# Patient Record
Sex: MALE | Race: WHITE | Employment: OTHER | URBAN - METROPOLITAN AREA
[De-identification: names, ages, dates, MRNs, and addresses within clinical notes are randomized per-mention and may not be internally consistent; named-entity substitution may affect disease eponyms.]

---

## 2022-02-28 ENCOUNTER — OFFICE VISIT (OUTPATIENT)
Dept: INTERNAL MEDICINE | Age: 87
End: 2022-02-28
Payer: MEDICARE

## 2022-02-28 VITALS
DIASTOLIC BLOOD PRESSURE: 68 MMHG | OXYGEN SATURATION: 98 % | WEIGHT: 182 LBS | SYSTOLIC BLOOD PRESSURE: 138 MMHG | BODY MASS INDEX: 26.11 KG/M2 | HEART RATE: 63 BPM

## 2022-02-28 DIAGNOSIS — H60.502 ACUTE OTITIS EXTERNA OF LEFT EAR, UNSPECIFIED TYPE: ICD-10-CM

## 2022-02-28 DIAGNOSIS — H91.92 DECREASED HEARING OF LEFT EAR: ICD-10-CM

## 2022-02-28 DIAGNOSIS — H61.22 IMPACTED CERUMEN OF LEFT EAR: ICD-10-CM

## 2022-02-28 PROBLEM — Z96.1 PSEUDOPHAKIA: Status: ACTIVE | Noted: 2018-08-27

## 2022-02-28 PROCEDURE — 99212 OFFICE O/P EST SF 10 MIN: CPT

## 2022-02-28 RX ORDER — OFLOXACIN 3 MG/ML
10 SOLUTION AURICULAR (OTIC) DAILY
Qty: 3.5 ML | Refills: 0 | Status: SHIPPED | OUTPATIENT
Start: 2022-02-28 | End: 2022-03-07

## 2022-02-28 SDOH — ECONOMIC STABILITY: FOOD INSECURITY: WITHIN THE PAST 12 MONTHS, YOU WORRIED THAT YOUR FOOD WOULD RUN OUT BEFORE YOU GOT MONEY TO BUY MORE.: NEVER TRUE

## 2022-02-28 SDOH — ECONOMIC STABILITY: FOOD INSECURITY: WITHIN THE PAST 12 MONTHS, THE FOOD YOU BOUGHT JUST DIDN'T LAST AND YOU DIDN'T HAVE MONEY TO GET MORE.: NEVER TRUE

## 2022-02-28 ASSESSMENT — PATIENT HEALTH QUESTIONNAIRE - PHQ9
SUM OF ALL RESPONSES TO PHQ QUESTIONS 1-9: 0
8. MOVING OR SPEAKING SO SLOWLY THAT OTHER PEOPLE COULD HAVE NOTICED. OR THE OPPOSITE, BEING SO FIGETY OR RESTLESS THAT YOU HAVE BEEN MOVING AROUND A LOT MORE THAN USUAL: 0
9. THOUGHTS THAT YOU WOULD BE BETTER OFF DEAD, OR OF HURTING YOURSELF: 0
SUM OF ALL RESPONSES TO PHQ QUESTIONS 1-9: 0
4. FEELING TIRED OR HAVING LITTLE ENERGY: 0
5. POOR APPETITE OR OVEREATING: 0
SUM OF ALL RESPONSES TO PHQ9 QUESTIONS 1 & 2: 0
1. LITTLE INTEREST OR PLEASURE IN DOING THINGS: 0
3. TROUBLE FALLING OR STAYING ASLEEP: 0
7. TROUBLE CONCENTRATING ON THINGS, SUCH AS READING THE NEWSPAPER OR WATCHING TELEVISION: 0
SUM OF ALL RESPONSES TO PHQ QUESTIONS 1-9: 0
6. FEELING BAD ABOUT YOURSELF - OR THAT YOU ARE A FAILURE OR HAVE LET YOURSELF OR YOUR FAMILY DOWN: 0
10. IF YOU CHECKED OFF ANY PROBLEMS, HOW DIFFICULT HAVE THESE PROBLEMS MADE IT FOR YOU TO DO YOUR WORK, TAKE CARE OF THINGS AT HOME, OR GET ALONG WITH OTHER PEOPLE: 0
SUM OF ALL RESPONSES TO PHQ QUESTIONS 1-9: 0
2. FEELING DOWN, DEPRESSED OR HOPELESS: 0

## 2022-02-28 ASSESSMENT — SOCIAL DETERMINANTS OF HEALTH (SDOH): HOW HARD IS IT FOR YOU TO PAY FOR THE VERY BASICS LIKE FOOD, HOUSING, MEDICAL CARE, AND HEATING?: NOT HARD AT ALL

## 2022-02-28 NOTE — PATIENT INSTRUCTIONS
Debrox ear drops available over the counter. Use as needed to aid in cerumen removal.    Thank you for enrolling in [address] Please follow the instructions below to securely access your online medical record. [email or web address] allows you to send messages to your doctor, view your test results, renew your prescriptions, schedule appointments, and more. How Do I Sign Up? 1. In your Internet browser, go to [email or web address]  2. Click on the Sign Up Now link in the Sign In box. You will see the New Member Sign Up page. 3. Enter your [email or web address] Access Code exactly as it appears below. You will not need to use this code after youve completed the sign-up process. If you do not sign up before the expiration date, you must request a new code. [email or web address] Access Code: [email or web address]  Expires: 4/14/2022  3:09 PM    4. Enter your Social Security Number ([id number]) and Date of Birth (mm/dd/yyyy) as indicated and click Submit. You will be taken to the next sign-up page. 5. Create a [email or web address] ID. This will be your [email or web address] login ID and cannot be changed, so think of one that is secure and easy to remember. 6. Create a [email or web address] password. You can change your password at any time. 7. Enter your Password Reset Question and Answer. This can be used at a later time if you forget your password. 8. Enter your e-mail address. You will receive e-mail notification when new information is available in [address] 9. Click Sign Up. You can now view your medical record. Additional Information  If you have questions, please contact the physician practice where you receive care. Remember, [email or web address] is NOT to be used for urgent needs. For medical emergencies, dial 911. For questions regarding your [email or web address] account call [phone]. If you have a clinical question, please call your doctor's office.   Patient Education        Swimmer's Ear: Care Instructions  Your Care Instructions     Swimmer's ear (otitis externa) is inflammation or infection of the ear canal. This is the passage that leads from the outer ear to the eardrum. Any water, sand, or other debris that gets into the ear canal and stays there can cause swimmer's ear. Putting cotton swabs or other items in the ear to clean it can also cause this problem. Swimmer's ear can be very painful. But you can treat the pain and infection with medicines. You should feel better in a few days. Follow-up care is a key part of your treatment and safety. Be sure to make and go to all appointments, and call your doctor if you are having problems. It's also a good idea to know your test results and keep a list of the medicines you take. How can you care for yourself at home? Cleaning and care  · Use antibiotic drops as your doctor directs. · Do not insert ear drops (other than the antibiotic ear drops) or anything else into the ear unless your doctor has told you to. · Avoid getting water in the ear until the problem clears up. Use cotton lightly coated with petroleum jelly as an earplug. Do not use plastic earplugs. · Use a hair dryer set on low to carefully dry the ear after you shower. · To ease ear pain, hold a warm washcloth against your ear. · Take pain medicines exactly as directed. ? If the doctor gave you a prescription medicine for pain, take it as prescribed. ? If you are not taking a prescription pain medicine, ask your doctor if you can take an over-the-counter medicine. Inserting ear drops  · Warm the drops to body temperature by rolling the container in your hands. Or you can place it in a cup of warm water for a few minutes. · Lie down, with your ear facing up. · Place drops inside the ear. Follow your doctor's instructions (or the directions on the label) for how many drops to use. Gently wiggle the outer ear or pull the ear up and back to help the drops get into the ear. · It's important to keep the liquid in the ear canal for 3 to 5 minutes.   When should you call for help? Call your doctor now or seek immediate medical care if:    · You have a new or higher fever.     · You have new or worse pain, swelling, warmth, or redness around or behind your ear.     · You have new or increasing pus or blood draining from your ear. Watch closely for changes in your health, and be sure to contact your doctor if:    · You are not getting better after 2 days (48 hours). Where can you learn more? Go to [email or web address] and sign in to your [employer] account. Enter C706 in the [employer] box to learn more about \"Swimmer's Ear: Care Instructions. \"     If you do not have an account, please click on the \"Sign Up Now\" link. Current as of: September 8, 2021               Content Version: 13.1  © 7785-7461 Healthwise, Incorporated. Care instructions adapted under license by [facility] ([facility]). If you have questions about a medical condition or this instruction, always ask your healthcare professional. [address] any warranty or liability for your use of this information.

## 2022-03-09 ENCOUNTER — OFFICE VISIT (OUTPATIENT)
Dept: INTERNAL MEDICINE | Age: 87
End: 2022-03-09
Payer: MEDICARE

## 2022-03-09 VITALS
DIASTOLIC BLOOD PRESSURE: 80 MMHG | HEIGHT: 70 IN | WEIGHT: 182 LBS | BODY MASS INDEX: 26.05 KG/M2 | OXYGEN SATURATION: 96 % | SYSTOLIC BLOOD PRESSURE: 138 MMHG | HEART RATE: 71 BPM

## 2022-03-09 DIAGNOSIS — S01.21XA LACERATION OF NOSE WITHOUT COMPLICATION, INITIAL ENCOUNTER: Primary | ICD-10-CM

## 2022-03-09 PROCEDURE — 99213 OFFICE O/P EST LOW 20 MIN: CPT | Performed by: NURSE PRACTITIONER

## 2022-03-09 ASSESSMENT — ENCOUNTER SYMPTOMS
WHEEZING: 0
CHEST TIGHTNESS: 0
SHORTNESS OF BREATH: 0
TROUBLE SWALLOWING: 0
FACIAL SWELLING: 0
COUGH: 0
SORE THROAT: 0

## 2022-03-09 NOTE — PROGRESS NOTES
Mariaelena Miller (:  1933) is a 80 y.o. male, Established patient, here for evaluation of the following chief complaint(s):  Fall (Pt fell and hit nose )      Vitals:    22 1714   BP: 138/80   Pulse: 71   SpO2: 96%       ASSESSMENT/PLAN:  1. Laceration of nose without complication, initial encounter        -    Keep clean and dry        -    Do not get steri strip wet for 24-36 hours        -    Return as needed    Return if symptoms worsen or fail to improve. SUBJECTIVE/OBJECTIVE:    Pt states he tripped over the parking cement block where people park their cars over at [employer]. Pt states someone was holding the door open for him and he forgot the cement parking blocks were there and he tripped, fell and hit his nose. No LOC, no other injuries. Pt went home cleaned with soap and water, requests to try to avoid stitches if possible. States nose sore, but its ok. Pt applying pressure with gauze, bleeding is controlled. Review of Systems   Constitutional: Negative for chills, fatigue and fever. HENT: Negative for congestion, facial swelling, sore throat and trouble swallowing. Respiratory: Negative for cough, chest tightness, shortness of breath and wheezing. Cardiovascular: Negative for chest pain and palpitations. Musculoskeletal: Negative for arthralgias, myalgias and neck pain. Skin: Positive for wound. Negative for pallor and rash. Physical Exam  Vitals reviewed. Constitutional:       General: He is not in acute distress. Appearance: Normal appearance. HENT:      Nose: Nose lacerations: approx 0.7cm vertical laceration to the left nares/septum. Comments: Laceration less than 0.3 cm deep. Bleeding controlled. Pt request no stitch if possible. Skin around lac cleaned w/soap and water PTA. Cleaned skin around the lac with alcohol, placed 1 steri strip, edges of wound approximated. Mouth/Throat:      Lips: Pink.       Mouth: Mucous membranes are moist.      Pharynx: Oropharynx is clear. No pharyngeal swelling or posterior oropharyngeal erythema. Cardiovascular:      Rate and Rhythm: Normal rate and regular rhythm. Heart sounds: Normal heart sounds, S1 normal and S2 normal.   Pulmonary:      Effort: Pulmonary effort is normal. No respiratory distress. Breath sounds: Normal air entry. Skin:     General: Skin is warm and dry. Findings: Laceration (left side of nasal septum) present. Neurological:      Mental Status: He is alert and oriented to person, place, and time. Psychiatric:         Mood and Affect: Mood normal.         Behavior: Behavior is cooperative. An electronic signature was used to authenticate this note.     --Arti Olivarez, APRN

## 2022-07-26 NOTE — PROGRESS NOTES
Subjective:      Constantin Waller is a 80 y.o. male who presents for evaluation of a plugged ear. He noticed the symptoms in the left ear, a few days ago. Sunday Boss admits to ear pain. Patient's medications, allergies, past medical, surgical, social and family histories were reviewed and updated as appropriate. Review of Systems  A comprehensive review of systems was negative except for: Ears, nose, mouth, throat, and face: positive for earaches in left ear. Objective:      /68   Pulse 63   Wt 182 lb (82.6 kg)   SpO2 98%   BMI 26.11 kg/m²   General: alert, appears stated age and cooperative   Right Ear: normal appearance   Left Ear:  cerumen impaction   After removal: erythematous on the left         Assessment:      Cerumen Impaction, with otitis externa. Plan:      Cerumen removed by flushing after use of wax softener. Care instructions given. Home treatment: Floxin Otic. Follow up as needed. Xolair Pregnancy And Lactation Text: This medication is Pregnancy Category B and is considered safe during pregnancy. This medication is excreted in breast milk.

## 2022-10-26 ENCOUNTER — NURSE ONLY (OUTPATIENT)
Dept: INTERNAL MEDICINE | Age: 87
End: 2022-10-26
Payer: MEDICARE

## 2022-10-26 DIAGNOSIS — Z23 NEEDS FLU SHOT: Primary | ICD-10-CM

## 2022-10-26 PROCEDURE — G0008 ADMIN INFLUENZA VIRUS VAC: HCPCS | Performed by: NURSE PRACTITIONER

## 2022-10-26 PROCEDURE — 90694 VACC AIIV4 NO PRSRV 0.5ML IM: CPT | Performed by: NURSE PRACTITIONER

## 2022-10-26 NOTE — PROGRESS NOTES
After obtaining consent, and per orders of Vinay Mcintyre, injection of flu vaccine given in Left deltoid by Gloria Porter MA. Patient instructed to remain in clinic for 20 minutes afterwards, and to report any adverse reaction to me immediately. Vaccine Information Sheet, \"Influenza - Inactivated\"  given to Eben Barnes, or parent/legal guardian of  Eben Barnes and verbalized understanding. Patient responses:    Have you ever had a reaction to a flu vaccine? No  Are you able to eat eggs without adverse effects? Yes  Do you have any current illness? No  Have you ever had Guillian [place] Syndrome? No    Flu vaccine given per order. Please see immunization tab.

## 2022-11-04 ENCOUNTER — OFFICE VISIT (OUTPATIENT)
Dept: INTERNAL MEDICINE | Age: 87
End: 2022-11-04
Payer: MEDICARE

## 2022-11-04 VITALS
SYSTOLIC BLOOD PRESSURE: 132 MMHG | OXYGEN SATURATION: 99 % | WEIGHT: 174 LBS | TEMPERATURE: 98.2 F | HEIGHT: 70 IN | DIASTOLIC BLOOD PRESSURE: 70 MMHG | BODY MASS INDEX: 24.91 KG/M2 | HEART RATE: 69 BPM

## 2022-11-04 DIAGNOSIS — J01.40 ACUTE NON-RECURRENT PANSINUSITIS: ICD-10-CM

## 2022-11-04 DIAGNOSIS — H65.03 NON-RECURRENT ACUTE SEROUS OTITIS MEDIA OF BOTH EARS: Primary | ICD-10-CM

## 2022-11-04 PROCEDURE — 1123F ACP DISCUSS/DSCN MKR DOCD: CPT | Performed by: NURSE PRACTITIONER

## 2022-11-04 PROCEDURE — 99213 OFFICE O/P EST LOW 20 MIN: CPT | Performed by: NURSE PRACTITIONER

## 2022-11-04 RX ORDER — FLUTICASONE PROPIONATE 50 MCG
1 SPRAY, SUSPENSION (ML) NASAL DAILY
Qty: 1 EACH | Refills: 0 | Status: SHIPPED | OUTPATIENT
Start: 2022-11-04

## 2022-11-04 RX ORDER — LORATADINE 10 MG/1
10 TABLET ORAL DAILY
Qty: 30 TABLET | Refills: 0 | Status: SHIPPED | OUTPATIENT
Start: 2022-11-04

## 2022-11-04 RX ORDER — AMOXICILLIN 500 MG/1
500 CAPSULE ORAL 2 TIMES DAILY
Qty: 14 CAPSULE | Refills: 0 | Status: SHIPPED | OUTPATIENT
Start: 2022-11-04 | End: 2022-11-11

## 2022-11-04 RX ORDER — ACETAMINOPHEN 500 MG
500 TABLET ORAL EVERY 6 HOURS PRN
Qty: 120 TABLET | Refills: 0 | Status: SHIPPED | OUTPATIENT
Start: 2022-11-04

## 2022-11-04 NOTE — PROGRESS NOTES
Tim Smith (:  1933) is a 80 y.o. male, Established patient, here for evaluation of the following chief complaint(s): Other (Fuentes ear pain needs flushed )      Vitals:    22 1522   BP: 132/70   Pulse: 69   Temp: 98.2 °F (36.8 °C)   SpO2: 99%       ASSESSMENT/PLAN:  1. Non-recurrent acute serous otitis media of both ears  -     fluticasone (FLONASE) 50 MCG/ACT nasal spray; 1 spray by Nasal route daily, Disp-1 each, R-0Normal  -     amoxicillin (AMOXIL) 500 MG capsule; Take 1 capsule by mouth 2 times daily for 7 days, Disp-14 capsule, R-0Normal  -     loratadine (CLARITIN) 10 MG tablet; Take 1 tablet by mouth daily, Disp-30 tablet, R-0Normal  2. Acute non-recurrent pansinusitis  -     fluticasone (FLONASE) 50 MCG/ACT nasal spray; 1 spray by Nasal route daily, Disp-1 each, R-0Normal  -     loratadine (CLARITIN) 10 MG tablet; Take 1 tablet by mouth daily, Disp-30 tablet, R-0Normal  -     acetaminophen (TYLENOL) 500 MG tablet; Take 1 tablet by mouth every 6 hours as needed for Pain, Disp-120 tablet, R-0Normal      Return if symptoms worsen or fail to improve. SUBJECTIVE/OBJECTIVE:    Other  This is a new problem. Episode onset: bilateral ear pain, states would like ears flushed, thinks wax is in both ears. The problem occurs constantly. The problem has been unchanged. Associated symptoms include congestion and coughing. Pertinent negatives include no arthralgias, chest pain, chills, fatigue, fever, myalgias, neck pain or sore throat. Nothing aggravates the symptoms. He has tried nothing for the symptoms. Review of Systems   Constitutional:  Negative for chills, fatigue and fever. HENT:  Positive for congestion, ear pain (bilateral), postnasal drip, rhinorrhea and sinus pressure. Negative for sore throat. Eyes:  Negative for discharge, redness and itching. Respiratory:  Positive for cough. Negative for shortness of breath and wheezing.     Cardiovascular:  Negative for chest pain and palpitations. Musculoskeletal:  Negative for arthralgias, myalgias and neck pain. Physical Exam  Vitals reviewed. Constitutional:       General: He is awake. He is not in acute distress. Appearance: Normal appearance. HENT:      Right Ear: A middle ear effusion is present. Tympanic membrane is not erythematous. Left Ear: A middle ear effusion is present. Tympanic membrane is not erythematous. Nose: Mucosal edema present. Right Turbinates: Swollen. Left Turbinates: Swollen. Right Sinus: Maxillary sinus tenderness and frontal sinus tenderness present. Left Sinus: Maxillary sinus tenderness and frontal sinus tenderness present. Mouth/Throat:      Lips: Pink. Mouth: Mucous membranes are moist.      Pharynx: No pharyngeal swelling, oropharyngeal exudate (post nasal drip) or posterior oropharyngeal erythema. Eyes:      General: Lids are normal.      Extraocular Movements: Extraocular movements intact. Conjunctiva/sclera: Conjunctivae normal.      Pupils: Pupils are equal, round, and reactive to light. Cardiovascular:      Rate and Rhythm: Normal rate and regular rhythm. Heart sounds: Normal heart sounds, S1 normal and S2 normal.   Pulmonary:      Effort: Pulmonary effort is normal. No respiratory distress. Breath sounds: Normal air entry. No decreased breath sounds, wheezing, rhonchi or rales. Musculoskeletal:      Cervical back: Normal range of motion. Skin:     General: Skin is warm and dry. Neurological:      Mental Status: He is alert and oriented to person, place, and time. Psychiatric:         Mood and Affect: Mood normal.         Behavior: Behavior is cooperative. An electronic signature was used to authenticate this note.     --JEANNINE Reagan

## 2022-11-06 ASSESSMENT — ENCOUNTER SYMPTOMS
SHORTNESS OF BREATH: 0
EYE REDNESS: 0
SORE THROAT: 0
EYE ITCHING: 0
WHEEZING: 0
EYE DISCHARGE: 0
RHINORRHEA: 1
COUGH: 1
SINUS PRESSURE: 1

## 2023-10-06 ENCOUNTER — OFFICE VISIT (OUTPATIENT)
Dept: FAMILY MEDICINE CLINIC | Age: 88
End: 2023-10-06
Payer: MEDICARE

## 2023-10-06 VITALS
WEIGHT: 164 LBS | DIASTOLIC BLOOD PRESSURE: 70 MMHG | HEIGHT: 70 IN | BODY MASS INDEX: 23.48 KG/M2 | TEMPERATURE: 98.3 F | OXYGEN SATURATION: 97 % | SYSTOLIC BLOOD PRESSURE: 138 MMHG | HEART RATE: 78 BPM

## 2023-10-06 DIAGNOSIS — Z87.448 HISTORY OF KIDNEY PROBLEMS: Primary | ICD-10-CM

## 2023-10-06 PROCEDURE — 99212 OFFICE O/P EST SF 10 MIN: CPT | Performed by: NURSE PRACTITIONER

## 2023-10-06 PROCEDURE — 1123F ACP DISCUSS/DSCN MKR DOCD: CPT | Performed by: NURSE PRACTITIONER

## 2023-10-06 PROCEDURE — G8427 DOCREV CUR MEDS BY ELIG CLIN: HCPCS | Performed by: NURSE PRACTITIONER

## 2023-10-06 PROCEDURE — G8420 CALC BMI NORM PARAMETERS: HCPCS | Performed by: NURSE PRACTITIONER

## 2023-10-06 PROCEDURE — 1036F TOBACCO NON-USER: CPT | Performed by: NURSE PRACTITIONER

## 2023-10-06 PROCEDURE — G8484 FLU IMMUNIZE NO ADMIN: HCPCS | Performed by: NURSE PRACTITIONER

## 2023-10-06 SDOH — ECONOMIC STABILITY: FOOD INSECURITY: WITHIN THE PAST 12 MONTHS, THE FOOD YOU BOUGHT JUST DIDN'T LAST AND YOU DIDN'T HAVE MONEY TO GET MORE.: NEVER TRUE

## 2023-10-06 SDOH — ECONOMIC STABILITY: FOOD INSECURITY: WITHIN THE PAST 12 MONTHS, YOU WORRIED THAT YOUR FOOD WOULD RUN OUT BEFORE YOU GOT MONEY TO BUY MORE.: NEVER TRUE

## 2023-10-06 SDOH — ECONOMIC STABILITY: INCOME INSECURITY: HOW HARD IS IT FOR YOU TO PAY FOR THE VERY BASICS LIKE FOOD, HOUSING, MEDICAL CARE, AND HEATING?: NOT HARD AT ALL

## 2023-10-06 SDOH — ECONOMIC STABILITY: HOUSING INSECURITY
IN THE LAST 12 MONTHS, WAS THERE A TIME WHEN YOU DID NOT HAVE A STEADY PLACE TO SLEEP OR SLEPT IN A SHELTER (INCLUDING NOW)?: NO

## 2023-10-06 ASSESSMENT — ENCOUNTER SYMPTOMS
NAUSEA: 0
ABDOMINAL PAIN: 0
BACK PAIN: 0
WHEEZING: 0
SHORTNESS OF BREATH: 0
DIARRHEA: 0
VOMITING: 0
COUGH: 0

## 2023-10-06 ASSESSMENT — PATIENT HEALTH QUESTIONNAIRE - PHQ9
1. LITTLE INTEREST OR PLEASURE IN DOING THINGS: 0
7. TROUBLE CONCENTRATING ON THINGS, SUCH AS READING THE NEWSPAPER OR WATCHING TELEVISION: 0
SUM OF ALL RESPONSES TO PHQ9 QUESTIONS 1 & 2: 0
8. MOVING OR SPEAKING SO SLOWLY THAT OTHER PEOPLE COULD HAVE NOTICED. OR THE OPPOSITE, BEING SO FIGETY OR RESTLESS THAT YOU HAVE BEEN MOVING AROUND A LOT MORE THAN USUAL: 0
SUM OF ALL RESPONSES TO PHQ QUESTIONS 1-9: 0
9. THOUGHTS THAT YOU WOULD BE BETTER OFF DEAD, OR OF HURTING YOURSELF: 0
4. FEELING TIRED OR HAVING LITTLE ENERGY: 0
5. POOR APPETITE OR OVEREATING: 0
10. IF YOU CHECKED OFF ANY PROBLEMS, HOW DIFFICULT HAVE THESE PROBLEMS MADE IT FOR YOU TO DO YOUR WORK, TAKE CARE OF THINGS AT HOME, OR GET ALONG WITH OTHER PEOPLE: 0
2. FEELING DOWN, DEPRESSED OR HOPELESS: 0
3. TROUBLE FALLING OR STAYING ASLEEP: 0
6. FEELING BAD ABOUT YOURSELF - OR THAT YOU ARE A FAILURE OR HAVE LET YOURSELF OR YOUR FAMILY DOWN: 0
SUM OF ALL RESPONSES TO PHQ QUESTIONS 1-9: 0

## 2023-11-15 ENCOUNTER — OFFICE VISIT (OUTPATIENT)
Dept: FAMILY MEDICINE CLINIC | Age: 88
End: 2023-11-15
Payer: MEDICARE

## 2023-11-15 VITALS
DIASTOLIC BLOOD PRESSURE: 62 MMHG | OXYGEN SATURATION: 98 % | WEIGHT: 154 LBS | HEIGHT: 70 IN | HEART RATE: 79 BPM | BODY MASS INDEX: 22.05 KG/M2 | SYSTOLIC BLOOD PRESSURE: 138 MMHG | TEMPERATURE: 97.1 F

## 2023-11-15 DIAGNOSIS — Z48.00 ENCOUNTER FOR CHANGE OF DRESSING: Primary | ICD-10-CM

## 2023-11-15 PROCEDURE — 99212 OFFICE O/P EST SF 10 MIN: CPT | Performed by: NURSE PRACTITIONER

## 2023-11-15 PROCEDURE — 1123F ACP DISCUSS/DSCN MKR DOCD: CPT | Performed by: NURSE PRACTITIONER

## 2023-11-15 ASSESSMENT — ENCOUNTER SYMPTOMS
TROUBLE SWALLOWING: 0
COUGH: 0
CHEST TIGHTNESS: 0
FACIAL SWELLING: 0
SORE THROAT: 0
SHORTNESS OF BREATH: 0
WHEEZING: 0

## 2023-12-21 ENCOUNTER — HOSPITAL ENCOUNTER (INPATIENT)
Facility: HOSPITAL | Age: 88
LOS: 4 days | Discharge: NURSING, REHAB OR LONG-TERM CARE | DRG: 602 | End: 2023-12-26
Attending: STUDENT IN AN ORGANIZED HEALTH CARE EDUCATION/TRAINING PROGRAM | Admitting: STUDENT IN AN ORGANIZED HEALTH CARE EDUCATION/TRAINING PROGRAM
Payer: MEDICARE

## 2023-12-21 ENCOUNTER — APPOINTMENT (OUTPATIENT)
Dept: RADIOLOGY | Facility: HOSPITAL | Age: 88
DRG: 602 | End: 2023-12-21
Payer: MEDICARE

## 2023-12-21 ENCOUNTER — APPOINTMENT (OUTPATIENT)
Dept: CARDIOLOGY | Facility: HOSPITAL | Age: 88
DRG: 602 | End: 2023-12-21
Payer: MEDICARE

## 2023-12-21 DIAGNOSIS — D64.9 NORMOCYTIC ANEMIA: ICD-10-CM

## 2023-12-21 DIAGNOSIS — L03.115 CELLULITIS OF BOTH LOWER EXTREMITIES: Primary | ICD-10-CM

## 2023-12-21 DIAGNOSIS — R79.89 ELEVATED TROPONIN: ICD-10-CM

## 2023-12-21 DIAGNOSIS — N18.6 ESRD (END STAGE RENAL DISEASE) (MULTI): ICD-10-CM

## 2023-12-21 DIAGNOSIS — L03.116 CELLULITIS OF BOTH LOWER EXTREMITIES: Primary | ICD-10-CM

## 2023-12-21 DIAGNOSIS — L03.119 CELLULITIS OF LOWER EXTREMITY, UNSPECIFIED LATERALITY: ICD-10-CM

## 2023-12-21 PROBLEM — L03.90 CELLULITIS: Status: ACTIVE | Noted: 2023-12-21

## 2023-12-21 LAB
ALBUMIN SERPL BCP-MCNC: 2.9 G/DL (ref 3.4–5)
ALP SERPL-CCNC: 77 U/L (ref 33–136)
ALT SERPL W P-5'-P-CCNC: 6 U/L (ref 10–52)
ANION GAP SERPL CALC-SCNC: 28 MMOL/L (ref 10–20)
AST SERPL W P-5'-P-CCNC: 8 U/L (ref 9–39)
BASOPHILS # BLD AUTO: 0.01 X10*3/UL (ref 0–0.1)
BASOPHILS NFR BLD AUTO: 0.1 %
BILIRUB SERPL-MCNC: 0.4 MG/DL (ref 0–1.2)
BNP SERPL-MCNC: 989 PG/ML (ref 0–99)
BUN SERPL-MCNC: 201 MG/DL (ref 6–23)
CALCIUM SERPL-MCNC: 8.3 MG/DL (ref 8.6–10.3)
CARDIAC TROPONIN I PNL SERPL HS: 158 NG/L (ref 0–20)
CARDIAC TROPONIN I PNL SERPL HS: 171 NG/L (ref 0–20)
CHLORIDE SERPL-SCNC: 97 MMOL/L (ref 98–107)
CO2 SERPL-SCNC: 18 MMOL/L (ref 21–32)
CREAT SERPL-MCNC: 11.86 MG/DL (ref 0.5–1.3)
CRP SERPL-MCNC: 22.07 MG/DL
EOSINOPHIL # BLD AUTO: 0.01 X10*3/UL (ref 0–0.4)
EOSINOPHIL NFR BLD AUTO: 0.1 %
ERYTHROCYTE [DISTWIDTH] IN BLOOD BY AUTOMATED COUNT: 14.2 % (ref 11.5–14.5)
ERYTHROCYTE [SEDIMENTATION RATE] IN BLOOD BY WESTERGREN METHOD: 58 MM/H (ref 0–20)
GFR SERPL CREATININE-BSD FRML MDRD: 4 ML/MIN/1.73M*2
GLUCOSE SERPL-MCNC: 160 MG/DL (ref 74–99)
HCT VFR BLD AUTO: 27.7 % (ref 41–52)
HGB BLD-MCNC: 8.9 G/DL (ref 13.5–17.5)
IMM GRANULOCYTES # BLD AUTO: 0.09 X10*3/UL (ref 0–0.5)
IMM GRANULOCYTES NFR BLD AUTO: 0.8 % (ref 0–0.9)
LACTATE SERPL-SCNC: 2 MMOL/L (ref 0.4–2)
LYMPHOCYTES # BLD AUTO: 0.25 X10*3/UL (ref 0.8–3)
LYMPHOCYTES NFR BLD AUTO: 2.2 %
MCH RBC QN AUTO: 29.6 PG (ref 26–34)
MCHC RBC AUTO-ENTMCNC: 32.1 G/DL (ref 32–36)
MCV RBC AUTO: 92 FL (ref 80–100)
MONOCYTES # BLD AUTO: 0.55 X10*3/UL (ref 0.05–0.8)
MONOCYTES NFR BLD AUTO: 4.9 %
NEUTROPHILS # BLD AUTO: 10.25 X10*3/UL (ref 1.6–5.5)
NEUTROPHILS NFR BLD AUTO: 91.9 %
NRBC BLD-RTO: 0 /100 WBCS (ref 0–0)
PLATELET # BLD AUTO: 220 X10*3/UL (ref 150–450)
POTASSIUM SERPL-SCNC: 5.3 MMOL/L (ref 3.5–5.3)
PROT SERPL-MCNC: 7.7 G/DL (ref 6.4–8.2)
RBC # BLD AUTO: 3.01 X10*6/UL (ref 4.5–5.9)
SODIUM SERPL-SCNC: 138 MMOL/L (ref 136–145)
WBC # BLD AUTO: 11.2 X10*3/UL (ref 4.4–11.3)

## 2023-12-21 PROCEDURE — 2500000004 HC RX 250 GENERAL PHARMACY W/ HCPCS (ALT 636 FOR OP/ED): Performed by: STUDENT IN AN ORGANIZED HEALTH CARE EDUCATION/TRAINING PROGRAM

## 2023-12-21 PROCEDURE — 71045 X-RAY EXAM CHEST 1 VIEW: CPT

## 2023-12-21 PROCEDURE — 71045 X-RAY EXAM CHEST 1 VIEW: CPT | Performed by: RADIOLOGY

## 2023-12-21 PROCEDURE — 85025 COMPLETE CBC W/AUTO DIFF WBC: CPT

## 2023-12-21 PROCEDURE — 99223 1ST HOSP IP/OBS HIGH 75: CPT | Performed by: STUDENT IN AN ORGANIZED HEALTH CARE EDUCATION/TRAINING PROGRAM

## 2023-12-21 PROCEDURE — 80053 COMPREHEN METABOLIC PANEL: CPT

## 2023-12-21 PROCEDURE — 83605 ASSAY OF LACTIC ACID: CPT

## 2023-12-21 PROCEDURE — 87040 BLOOD CULTURE FOR BACTERIA: CPT | Mod: ELYLAB

## 2023-12-21 PROCEDURE — 36415 COLL VENOUS BLD VENIPUNCTURE: CPT

## 2023-12-21 PROCEDURE — 2500000004 HC RX 250 GENERAL PHARMACY W/ HCPCS (ALT 636 FOR OP/ED)

## 2023-12-21 PROCEDURE — 86140 C-REACTIVE PROTEIN: CPT

## 2023-12-21 PROCEDURE — 85652 RBC SED RATE AUTOMATED: CPT

## 2023-12-21 PROCEDURE — 99285 EMERGENCY DEPT VISIT HI MDM: CPT | Performed by: STUDENT IN AN ORGANIZED HEALTH CARE EDUCATION/TRAINING PROGRAM

## 2023-12-21 PROCEDURE — 96361 HYDRATE IV INFUSION ADD-ON: CPT

## 2023-12-21 PROCEDURE — G0378 HOSPITAL OBSERVATION PER HR: HCPCS

## 2023-12-21 PROCEDURE — 96365 THER/PROPH/DIAG IV INF INIT: CPT

## 2023-12-21 PROCEDURE — 93005 ELECTROCARDIOGRAM TRACING: CPT

## 2023-12-21 PROCEDURE — 83880 ASSAY OF NATRIURETIC PEPTIDE: CPT

## 2023-12-21 PROCEDURE — 84484 ASSAY OF TROPONIN QUANT: CPT

## 2023-12-21 RX ORDER — CHOLECALCIFEROL (VITAMIN D3) 25 MCG
2000 TABLET ORAL DAILY
COMMUNITY

## 2023-12-21 RX ORDER — ACETAMINOPHEN 160 MG/5ML
650 SOLUTION ORAL EVERY 4 HOURS PRN
Status: DISCONTINUED | OUTPATIENT
Start: 2023-12-21 | End: 2023-12-26 | Stop reason: HOSPADM

## 2023-12-21 RX ORDER — CALCITRIOL 0.25 UG/1
1 CAPSULE ORAL DAILY
COMMUNITY
Start: 2023-05-23

## 2023-12-21 RX ORDER — FLUTICASONE PROPIONATE 50 MCG
1 SPRAY, SUSPENSION (ML) NASAL DAILY
COMMUNITY
Start: 2022-11-04

## 2023-12-21 RX ORDER — FINASTERIDE 5 MG/1
5 TABLET, FILM COATED ORAL DAILY
COMMUNITY
Start: 2023-08-30

## 2023-12-21 RX ORDER — ACETAMINOPHEN 325 MG/1
650 TABLET ORAL EVERY 4 HOURS PRN
Status: DISCONTINUED | OUTPATIENT
Start: 2023-12-21 | End: 2023-12-26 | Stop reason: HOSPADM

## 2023-12-21 RX ORDER — DOXAZOSIN 4 MG/1
1 TABLET ORAL DAILY
COMMUNITY
Start: 2013-12-10

## 2023-12-21 RX ORDER — ACETAMINOPHEN 650 MG/1
650 SUPPOSITORY RECTAL EVERY 4 HOURS PRN
Status: DISCONTINUED | OUTPATIENT
Start: 2023-12-21 | End: 2023-12-26 | Stop reason: HOSPADM

## 2023-12-21 RX ORDER — SEVELAMER CARBONATE 800 MG/1
800 TABLET, FILM COATED ORAL
COMMUNITY
Start: 2023-11-08

## 2023-12-21 RX ORDER — SODIUM CHLORIDE 9 MG/ML
10 INJECTION, SOLUTION INTRAVENOUS EVERY 12 HOURS
Status: DISCONTINUED | OUTPATIENT
Start: 2023-12-21 | End: 2023-12-26 | Stop reason: HOSPADM

## 2023-12-21 RX ORDER — AMLODIPINE BESYLATE 5 MG/1
1 TABLET ORAL DAILY
COMMUNITY
Start: 2023-05-23

## 2023-12-21 RX ORDER — SODIUM BICARBONATE 650 MG/1
1 TABLET ORAL 2 TIMES DAILY
COMMUNITY
Start: 2023-11-08

## 2023-12-21 RX ORDER — HEPARIN SODIUM 5000 [USP'U]/ML
5000 INJECTION, SOLUTION INTRAVENOUS; SUBCUTANEOUS EVERY 8 HOURS
Status: DISCONTINUED | OUTPATIENT
Start: 2023-12-22 | End: 2023-12-26 | Stop reason: HOSPADM

## 2023-12-21 RX ORDER — LANOLIN ALCOHOL/MO/W.PET/CERES
1 CREAM (GRAM) TOPICAL DAILY
COMMUNITY
Start: 2023-10-18

## 2023-12-21 RX ORDER — POLYETHYLENE GLYCOL 3350 17 G/17G
17 POWDER, FOR SOLUTION ORAL DAILY
COMMUNITY

## 2023-12-21 RX ORDER — CARVEDILOL 25 MG/1
25 TABLET ORAL 2 TIMES DAILY
COMMUNITY
Start: 2023-04-11

## 2023-12-21 RX ORDER — FERROUS SULFATE 325(65) MG
325 TABLET ORAL
COMMUNITY
Start: 2023-05-30

## 2023-12-21 RX ORDER — POLYETHYLENE GLYCOL 3350 17 G/17G
17 POWDER, FOR SOLUTION ORAL DAILY PRN
Status: DISCONTINUED | OUTPATIENT
Start: 2023-12-21 | End: 2023-12-26 | Stop reason: HOSPADM

## 2023-12-21 RX ORDER — VANCOMYCIN HYDROCHLORIDE 1 G/20ML
INJECTION, POWDER, LYOPHILIZED, FOR SOLUTION INTRAVENOUS DAILY PRN
Status: DISCONTINUED | OUTPATIENT
Start: 2023-12-21 | End: 2023-12-26 | Stop reason: HOSPADM

## 2023-12-21 RX ORDER — TORSEMIDE 20 MG/1
2 TABLET ORAL DAILY
COMMUNITY
Start: 2023-04-11

## 2023-12-21 RX ADMIN — PIPERACILLIN SODIUM AND TAZOBACTAM SODIUM 3.38 G: 3; .375 INJECTION, SOLUTION INTRAVENOUS at 21:27

## 2023-12-21 RX ADMIN — SODIUM CHLORIDE, POTASSIUM CHLORIDE, SODIUM LACTATE AND CALCIUM CHLORIDE 500 ML: 600; 310; 30; 20 INJECTION, SOLUTION INTRAVENOUS at 15:53

## 2023-12-21 RX ADMIN — SODIUM CHLORIDE 10 ML/HR: 9 INJECTION, SOLUTION INTRAVENOUS at 21:27

## 2023-12-21 RX ADMIN — VANCOMYCIN HYDROCHLORIDE 2 G: 10 INJECTION, POWDER, LYOPHILIZED, FOR SOLUTION INTRAVENOUS at 16:35

## 2023-12-21 RX ADMIN — PIPERACILLIN AND TAZOBACTAM 4.5 G: 4; .5 INJECTION, POWDER, FOR SOLUTION INTRAVENOUS at 15:52

## 2023-12-21 SDOH — SOCIAL STABILITY: SOCIAL INSECURITY: ARE YOU OR HAVE YOU BEEN THREATENED OR ABUSED PHYSICALLY, EMOTIONALLY, OR SEXUALLY BY ANYONE?: NO

## 2023-12-21 SDOH — SOCIAL STABILITY: SOCIAL INSECURITY: DO YOU FEEL UNSAFE GOING BACK TO THE PLACE WHERE YOU ARE LIVING?: NO

## 2023-12-21 SDOH — SOCIAL STABILITY: SOCIAL INSECURITY: ABUSE: ADULT

## 2023-12-21 SDOH — SOCIAL STABILITY: SOCIAL INSECURITY: ARE THERE ANY APPARENT SIGNS OF INJURIES/BEHAVIORS THAT COULD BE RELATED TO ABUSE/NEGLECT?: NO

## 2023-12-21 SDOH — SOCIAL STABILITY: SOCIAL INSECURITY: DOES ANYONE TRY TO KEEP YOU FROM HAVING/CONTACTING OTHER FRIENDS OR DOING THINGS OUTSIDE YOUR HOME?: NO

## 2023-12-21 SDOH — SOCIAL STABILITY: SOCIAL INSECURITY: HAS ANYONE EVER THREATENED TO HURT YOUR FAMILY OR YOUR PETS?: NO

## 2023-12-21 SDOH — SOCIAL STABILITY: SOCIAL INSECURITY: HAVE YOU HAD THOUGHTS OF HARMING ANYONE ELSE?: NO

## 2023-12-21 SDOH — SOCIAL STABILITY: SOCIAL INSECURITY: DO YOU FEEL ANYONE HAS EXPLOITED OR TAKEN ADVANTAGE OF YOU FINANCIALLY OR OF YOUR PERSONAL PROPERTY?: NO

## 2023-12-21 ASSESSMENT — ACTIVITIES OF DAILY LIVING (ADL)
HEARING - RIGHT EAR: FUNCTIONAL
LACK_OF_TRANSPORTATION: NO
JUDGMENT_ADEQUATE_SAFELY_COMPLETE_DAILY_ACTIVITIES: YES
DRESSING YOURSELF: NEEDS ASSISTANCE
TOILETING: NEEDS ASSISTANCE
HEARING - LEFT EAR: FUNCTIONAL
PATIENT'S MEMORY ADEQUATE TO SAFELY COMPLETE DAILY ACTIVITIES?: YES
FEEDING YOURSELF: INDEPENDENT
WALKS IN HOME: NEEDS ASSISTANCE
BATHING: NEEDS ASSISTANCE
GROOMING: NEEDS ASSISTANCE
ADEQUATE_TO_COMPLETE_ADL: YES

## 2023-12-21 ASSESSMENT — LIFESTYLE VARIABLES
SKIP TO QUESTIONS 9-10: 1
HAVE PEOPLE ANNOYED YOU BY CRITICIZING YOUR DRINKING: NO
EVER FELT BAD OR GUILTY ABOUT YOUR DRINKING: NO
SUBSTANCE_ABUSE_PAST_12_MONTHS: NO
AUDIT-C TOTAL SCORE: 0
HOW OFTEN DO YOU HAVE A DRINK CONTAINING ALCOHOL: NEVER
HOW OFTEN DO YOU HAVE 6 OR MORE DRINKS ON ONE OCCASION: NEVER
PRESCIPTION_ABUSE_PAST_12_MONTHS: NO
AUDIT-C TOTAL SCORE: 0
EVER HAD A DRINK FIRST THING IN THE MORNING TO STEADY YOUR NERVES TO GET RID OF A HANGOVER: NO
HOW MANY STANDARD DRINKS CONTAINING ALCOHOL DO YOU HAVE ON A TYPICAL DAY: PATIENT DOES NOT DRINK
REASON UNABLE TO ASSESS: NO
HAVE YOU EVER FELT YOU SHOULD CUT DOWN ON YOUR DRINKING: NO

## 2023-12-21 ASSESSMENT — PAIN - FUNCTIONAL ASSESSMENT
PAIN_FUNCTIONAL_ASSESSMENT: 0-10
PAIN_FUNCTIONAL_ASSESSMENT: 0-10

## 2023-12-21 ASSESSMENT — COGNITIVE AND FUNCTIONAL STATUS - GENERAL
TOILETING: A LITTLE
STANDING UP FROM CHAIR USING ARMS: A LOT
MOBILITY SCORE: 12
PATIENT BASELINE BEDBOUND: UNABLE TO ASSESS AT THIS TIME
DAILY ACTIVITIY SCORE: 17
DRESSING REGULAR LOWER BODY CLOTHING: A LOT
TURNING FROM BACK TO SIDE WHILE IN FLAT BAD: A LOT
HELP NEEDED FOR BATHING: A LITTLE
DRESSING REGULAR UPPER BODY CLOTHING: A LITTLE
PERSONAL GROOMING: A LITTLE
EATING MEALS: A LITTLE
WALKING IN HOSPITAL ROOM: A LOT
MOVING FROM LYING ON BACK TO SITTING ON SIDE OF FLAT BED WITH BEDRAILS: A LITTLE
CLIMB 3 TO 5 STEPS WITH RAILING: TOTAL
MOVING TO AND FROM BED TO CHAIR: A LOT

## 2023-12-21 ASSESSMENT — COLUMBIA-SUICIDE SEVERITY RATING SCALE - C-SSRS
1. IN THE PAST MONTH, HAVE YOU WISHED YOU WERE DEAD OR WISHED YOU COULD GO TO SLEEP AND NOT WAKE UP?: NO
2. HAVE YOU ACTUALLY HAD ANY THOUGHTS OF KILLING YOURSELF?: NO
6. HAVE YOU EVER DONE ANYTHING, STARTED TO DO ANYTHING, OR PREPARED TO DO ANYTHING TO END YOUR LIFE?: NO

## 2023-12-21 ASSESSMENT — PATIENT HEALTH QUESTIONNAIRE - PHQ9
1. LITTLE INTEREST OR PLEASURE IN DOING THINGS: NOT AT ALL
SUM OF ALL RESPONSES TO PHQ9 QUESTIONS 1 & 2: 0
2. FEELING DOWN, DEPRESSED OR HOPELESS: NOT AT ALL

## 2023-12-21 ASSESSMENT — PAIN SCALES - GENERAL: PAINLEVEL_OUTOF10: 3

## 2023-12-21 NOTE — ED PROVIDER NOTES
"HPI   Chief Complaint   Patient presents with    Wound Check     '[facility] and his left leg is also messed up too.\"       History provided by: Patient and patient's daughter    Limitations to history: None    CC: Wound check    HPI: 89-year-old male presents emergency department with his daughter to be evaluated for bilateral wound checks.  Patient states that he has had problems with his right leg for several months but it has been getting worse over the last few days.  Reports extremity warmth, swelling, redness.  Denies skin weeping with yellow discharge.  [facility] and [facility] that he come to the [facility] to be admitted.  He has not been seeing anyone from wound care been on any antibiotics.  He does have a history of CKD, they are planning on putting him on dialysis but he refused.  He denies history of CHF or cirrhosis.  Denies use of diuretics.  He denies any other history or lung history including ACS, arrhythmia, blood clots.  Denies recent plane flights or surgeries or history of cancer.  States now that he is starting to get the same symptoms in his left lower extremity.  Denies numbness and tingling.  Able to bear weight.  Is not sure how this started, denies any injury.  Denies history of frequent infections or diabetes.  Does report chills but denies fevers.  Denies weakness and fatigue.  Denies chest pain, shortness of breath, cough, mopped assist, all GI and  symptoms.    ROS: Negative unless mentioned in HPI    Social Hx: Denies tobacco, alcohol, drug use    Medical Hx: Medical history significant for chronic kidney disease.  Denies allergies.  Immunizations are up-to-date.    Surgical HX: Denies    Physical exam:    Constitutional: Patient is well-nourished and well-developed.  Sitting comfortably in the room and in no distress.  Oriented to person, place, time, and situation.    HEENT: Head is normocephalic, atraumatic. " Patient's airway is patent.  Tympanic membranes are clear bilaterally.  Nasal mucosa clear.  Mouth with normal mucosa.  Throat is not erythematous and there are no oropharyngeal exudates, uvula is midline.  No obvious facial deformities.    Eyes: Clear bilaterally.  Pupils are equal round and reactive to light and accommodation.  Extraocular movements intact.      Cardiac: Regular rate, regular rhythm.  Heart sounds S1, S2.  No murmurs, rubs, or gallops.  PMI nondisplaced.  No JVD.    Respiratory: Regular respiratory rate and effort.  Breath sounds are clear and equal bilaterally, no adventitious lung sounds.  Patient is speaking in full sentences and is in no apparent respiratory distress. No use of accessory muscles.      Gastrointestinal: Abdomen is soft, nondistended, and nontender.  There are no obvious deformities.  No rebound tenderness or guarding.  Bowel sounds are normal active.    Genitourinary: No CVA or flank tenderness.    Musculoskeletal: Patient has 2+ pitting edema in the bilateral lower extremities. He has skin redness, warmthN, and skin weeping with clear and yellow discharge of the right tibia and fibula and foot that is circumferential.  Also has these findings of the left tibia and fibula without involvement of the foot.  No calf tenderness.  Negative Homans' sign.  No obvious bony deformities.  Patient has equal range of motion in all extremities and no strength deficiencies.  No muscle or joint tenderness. No back or neck tenderness.  Capillary refill less than 3 seconds.  Strong peripheral pulses.  No sensory deficits.    Neurological: Patient is alert and oriented.  No focal deficits.  5/5 strength in all extremities.  Cranial nerves II through XII intact. GCS15.     Skin: See MSK exam for details.    Psych: Appropriate mood and affect.  No apparent risk to self or others.    Heme/lymph: No adenopathy, lymphadenopathy, or splenomegaly    Physical exam is otherwise negative unless stated  above or in history of present illness.                          Ward Coma Scale Score: 15                  Patient History   History reviewed. No pertinent past medical history.  History reviewed. No pertinent surgical history.  No family history on file.  Social History     Tobacco Use    Smoking status: Never    Smokeless tobacco: Never   Vaping Use    Vaping Use: Never used   Substance Use Topics    Alcohol use: Never    Drug use: Never       Physical Exam   ED Triage Vitals [12/21/23 1418]   Temp Heart Rate Resp BP   36.1 °C (97 °F) 79 18 131/60      SpO2 Temp Source Heart Rate Source Patient Position   99 % Tympanic Monitor Sitting      BP Location FiO2 (%)     Right arm --       Physical Exam    ED Course & MDM   Diagnoses as of 12/21/23 1709   Cellulitis of both lower extremities   ESRD (end stage renal disease) (CMS/[facility])   Elevated troponin   Normocytic anemia     Patient updated on plan for lab testing, IV insertion, radiology imaging, and medications to be administered while in the ER (if indicated). Patient updated on expected wait times for testing and results. Patient provided my name and told to ask any staff member for questions or concerns if they should arise. Electronic medical record reviewed.     [facility]    Upon initial assessment, patient was healthy non-toxic appearing and in no apparent distress.     Patient presented to the emergency department with the chief complaint lower extremity wound check. Patient has 2+ pitting edema in the bilateral lower extremities. He has skin redness, warmthN, and skin weeping with clear and yellow discharge of the right tibia and fibula and foot that is circumferential.  Also has these findings of the left tibia and fibula without involvement of the foot.  No calf tenderness.  Negative Homans' sign.  No obvious bony deformities.  Patient has equal range of motion in all extremities and no strength deficiencies.  No muscle or joint tenderness. No back or neck  tenderness.  Capillary refill less than 3 seconds.  Strong peripheral pulses.  No sensory deficits.  Examination otherwise unremarkable.  On arrival to the emergency department, vital signs were within normal limits    Will get blood cultures and will start the patient on IV vancomycin and Zosyn.  Workup initial including basic blood work, EKG, troponin, BNP, chest x-ray given the patient's peripheral edema, inflammatory markers and lactate.  I staffed this patient with my attending, agreeable to plan of care.  Patient's EKG was performed at 1552 and interpreted by me.  There is a normal sinus rhythm 79 beats minute.  IA interval is slightly elongated and there are findings that would suggest a first-degree AV block.  Patient also has an incomplete left bundle branch block.  Patient has diffuse T wave inversion in leads I, aVL, V4 through V6.  There is no ST elevation or depression.  No prolonged QT.    Patient's lactate is within normal limits.  ESR is 58 and CRP is 22.  Original troponin is 170, repeat is 158.  Likely from end-stage renal disease.  BNP is 99 however chest x-ray shows, cardiomegaly but no pulmonary edema or pleural effusion.  CBC shows no leukocytosis however he does have a left shift neutrophil count.  Patient has a normocytic anemia, unknown if this is baseline for him however I do believe is likely from his CKD.  Denies any blood in the urine or stool.  CMP confirms his history of end-stage renal disease without findings that would suggest the patient requires emergent dialysis at this time.  Patient is aware of the repercussions of end-stage renal disease without dialysis.  At this time, I do believe the patient benefit from hospital admission for IV antibiotics for his worsening bilateral lower extremity cellulitis.  I spoke to the hospitalist who is agreeable this plan.  Both the patient and patient's daughter are agreeable.  He remained hemodynamically stable in the ER.        This note was  dictated using a speech recognition program.  While an attempt was made at proof-reading to minimize errors, minor errors in transcription may be present    Medical Decision Making      Procedure  Procedures     Donnell Barksdale PA-C  12/21/23 0066

## 2023-12-21 NOTE — H&P
Medical Group History and Physical  ASSESSMENT & PLAN:     Bilateral lower extremity cellulitis  - Presented from home with worsening lower extremity erythema, swelling, drainage, pain over the past few months  - ESR and CRP significantly elevated  - Will continue vancomycin and Zosyn at this time  - ID consulted    CKD stage V  - GFR 4, creatinine 11.8,   - Patient has adamantly stated that he does not want hemodialysis to multiple providers, no need for nephrology consultation at this time    Essential hypertension  BPH  Hyperlipidemia  Diastolic CHF  - Resume home medications once reconciled    Anemia of chronic disease  - Hemoglobin 8.9 (baseline not available as [facility])    Elevated troponin  - Troponin 171, 158 likely in setting of CKD stage V  - No chest pain, low concerns for ACS with no EKG changes as well    VTE prophylaxis: Heparin subcutaneous      ---Of note, this documentation is completed using the Dragon Dictation system (voice recognition software). There may be spelling and/or grammatical errors that were not corrected prior to final submission.---    Matthew Edge MD    HISTORY OF PRESENT ILLNESS:   Chief Complaint: Bilateral lower extremity wounds    History Of Present Illness:    Joshua Gotti is a 89 y.o. male with a significant past medical history of CKD stage V not seeking hemodialysis, BPH, hypertension, diastolic CHF, hyperlipidemia that presented from home with worsening bilateral lower extremity wounds and swelling.  Patient is a poor historian however states that these wounds have been present for quite a long time however was unable to extrapolate on how long.  He states that his legs have been burning, have been warm, swollen and red with some drainage.  Patient states that he has not seen his PCP for this as well.    Patient has CKD stage V, he states that he does not want any hemodialysis.    ED course:  - Vitals: Temperature 97, HR 79, /60, RR 18  "satting 99% on room air.  - Labs: Hemoglobin of 8.9 otherwise unremarkable CBC.  CMP with renal function consistent with his CKD stage V status, creatinine of 11.8, , GFR 4.  CRP 22, ESR 58.  Troponin 171 decreased to 158.  - Interventions: Received LR bolus 500 mL, Zosyn and vancomycin.     Review of systems: 10 point review of systems is otherwise negative except as mentioned above.    PAST HISTORIES:     Past Medical History: CKD stage V, hypertension, BPH, diastolic CHF, hyperlipidemia    Past Surgical History: Patient unable to clarify      Social History: He states he never smoked tobacco in the past.  Denies current alcohol and illicit drug use.  Lives at home with his daughter.    Family History: Patient unable to clarify     Allergies: Patient has no known allergies.    OBJECTIVE:     Last Recorded Vitals:  Vitals:    12/21/23 1418 12/21/23 1602   BP: 131/60 129/63   BP Location: Right arm    Patient Position: Sitting    Pulse: 79 78   Resp: 18 16   Temp: 36.1 °C (97 °F)    TempSrc: Tympanic    SpO2: 99% 100%   Weight: 72.5 kg (159 lb 13.3 oz)    Height: 1.778 m (5' 10\")      Last I/O:  No intake/output data recorded.    Physical Exam  Vitals reviewed.   Constitutional:       Appearance: Normal appearance. He is normal weight.      Comments: Unkempt in appearance   HENT:      Head: Normocephalic and atraumatic.      Nose: Nose normal.      Mouth/Throat:      Mouth: Mucous membranes are moist.      Pharynx: Oropharynx is clear.      Comments: Poor dentition, missing most of teeth.  Eyes:      Extraocular Movements: Extraocular movements intact.      Conjunctiva/sclera: Conjunctivae normal.      Pupils: Pupils are equal, round, and reactive to light.   Cardiovascular:      Rate and Rhythm: Normal rate and regular rhythm.      Pulses: Normal pulses.      Heart sounds: Normal heart sounds.   Pulmonary:      Effort: Pulmonary effort is normal.      Breath sounds: Normal breath sounds.   Abdominal:      " General: Abdomen is flat. Bowel sounds are normal.      Palpations: Abdomen is soft.      Tenderness: There is no abdominal tenderness.   Musculoskeletal:         General: Normal range of motion.      Cervical back: Normal range of motion and neck supple.      Right lower leg: Edema present.      Left lower leg: Edema present.      Comments: Bilateral lower extremities with 3+ pitting edema.   Skin:     Findings: Erythema and lesion present.      Comments: See below for images of bilateral lower extremities.  Erythematous bilaterally, weeping lesions as well.     Neurological:      General: No focal deficit present.      Mental Status: He is alert and oriented to person, place, and time. Mental status is at baseline.         Scheduled Medications  vancomycin, 2 g, intravenous, Once      PRN Medications    Continuous Medications     Outpatient Medications:  Prior to Admission medications    Not on File     LABS AND IMAGING:     Labs:  Results from last 7 days   Lab Units 12/21/23  1535   WBC AUTO x10*3/uL 11.2   RBC AUTO x10*6/uL 3.01*   HEMOGLOBIN g/dL 8.9*   HEMATOCRIT % 27.7*   MCV fL 92   MCH pg 29.6   MCHC g/dL 32.1   RDW % 14.2   PLATELETS AUTO x10*3/uL 220     Results from last 7 days   Lab Units 12/21/23  1535   SODIUM mmol/L 138   POTASSIUM mmol/L 5.3   CHLORIDE mmol/L 97*   CO2 mmol/L 18*   BUN mg/dL 201*   CREATININE mg/dL 11.86*   GLUCOSE mg/dL 160*   PROTEIN TOTAL g/dL 7.7   CALCIUM mg/dL 8.3*   BILIRUBIN TOTAL mg/dL 0.4   ALK PHOS U/L 77   AST U/L 8*   ALT U/L 6*         Results from last 7 days   Lab Units 12/21/23  1618 12/21/23  1535   TROPHS ng/L 158* 171*       Imaging:  XR chest 1 view  Narrative: Interpreted By:  Noris Erickson,   STUDY:  XR CHEST 1 VIEW;  12/21/2023 4:10 pm      INDICATION:  Signs/Symptoms:peripheral edema.      COMPARISON:  None.      ACCESSION NUMBER(S):  [id number]      ORDERING CLINICIAN:  ERIC GREGG      FINDINGS:  Soft tissue fold overlies and obscures the central upper  chest.  Patient is slightly rotated. Reverse lordotic projection obtained. No  definite focal infiltrate, pleural effusion or pneumothorax as  visualized. The cardiac silhouette is mildly enlarged. Aortic  tortuosity.      Impression: Mildly enlarged cardiac silhouette. No definite focal infiltrate.      MACRO:  None.      Signed by: Noris Erickson 12/21/2023 4:32 PM  Dictation workstation:   [id number]

## 2023-12-22 PROBLEM — L03.116 CELLULITIS OF BOTH LOWER EXTREMITIES: Status: ACTIVE | Noted: 2023-12-22

## 2023-12-22 PROBLEM — L03.115 CELLULITIS OF BOTH LOWER EXTREMITIES: Status: ACTIVE | Noted: 2023-12-22

## 2023-12-22 LAB
ANION GAP SERPL CALC-SCNC: 23 MMOL/L (ref 10–20)
BASOPHILS # BLD AUTO: 0.01 X10*3/UL (ref 0–0.1)
BASOPHILS NFR BLD AUTO: 0.1 %
BUN SERPL-MCNC: 195 MG/DL (ref 6–23)
CALCIUM SERPL-MCNC: 7.8 MG/DL (ref 8.6–10.3)
CHLORIDE SERPL-SCNC: 101 MMOL/L (ref 98–107)
CO2 SERPL-SCNC: 18 MMOL/L (ref 21–32)
CREAT SERPL-MCNC: 12.09 MG/DL (ref 0.5–1.3)
EOSINOPHIL # BLD AUTO: 0.03 X10*3/UL (ref 0–0.4)
EOSINOPHIL NFR BLD AUTO: 0.4 %
ERYTHROCYTE [DISTWIDTH] IN BLOOD BY AUTOMATED COUNT: 14.3 % (ref 11.5–14.5)
GFR SERPL CREATININE-BSD FRML MDRD: 4 ML/MIN/1.73M*2
GLUCOSE SERPL-MCNC: 92 MG/DL (ref 74–99)
HCT VFR BLD AUTO: 22.4 % (ref 41–52)
HGB BLD-MCNC: 7.3 G/DL (ref 13.5–17.5)
HOLD SPECIMEN: NORMAL
IMM GRANULOCYTES # BLD AUTO: 0.07 X10*3/UL (ref 0–0.5)
IMM GRANULOCYTES NFR BLD AUTO: 1 % (ref 0–0.9)
LYMPHOCYTES # BLD AUTO: 0.38 X10*3/UL (ref 0.8–3)
LYMPHOCYTES NFR BLD AUTO: 5.2 %
MCH RBC QN AUTO: 29.3 PG (ref 26–34)
MCHC RBC AUTO-ENTMCNC: 32.6 G/DL (ref 32–36)
MCV RBC AUTO: 90 FL (ref 80–100)
MONOCYTES # BLD AUTO: 0.51 X10*3/UL (ref 0.05–0.8)
MONOCYTES NFR BLD AUTO: 7 %
NEUTROPHILS # BLD AUTO: 6.25 X10*3/UL (ref 1.6–5.5)
NEUTROPHILS NFR BLD AUTO: 86.3 %
NRBC BLD-RTO: 0 /100 WBCS (ref 0–0)
PLATELET # BLD AUTO: 178 X10*3/UL (ref 150–450)
POTASSIUM SERPL-SCNC: 5.1 MMOL/L (ref 3.5–5.3)
RBC # BLD AUTO: 2.49 X10*6/UL (ref 4.5–5.9)
SODIUM SERPL-SCNC: 137 MMOL/L (ref 136–145)
VANCOMYCIN SERPL-MCNC: 14.2 UG/ML (ref 5–20)
VANCOMYCIN SERPL-MCNC: 16.6 UG/ML (ref 5–20)
WBC # BLD AUTO: 7.3 X10*3/UL (ref 4.4–11.3)

## 2023-12-22 PROCEDURE — 99232 SBSQ HOSP IP/OBS MODERATE 35: CPT | Performed by: STUDENT IN AN ORGANIZED HEALTH CARE EDUCATION/TRAINING PROGRAM

## 2023-12-22 PROCEDURE — 87070 CULTURE OTHR SPECIMN AEROBIC: CPT | Mod: ELYLAB | Performed by: STUDENT IN AN ORGANIZED HEALTH CARE EDUCATION/TRAINING PROGRAM

## 2023-12-22 PROCEDURE — 2500000004 HC RX 250 GENERAL PHARMACY W/ HCPCS (ALT 636 FOR OP/ED): Performed by: PHARMACIST

## 2023-12-22 PROCEDURE — 2500000004 HC RX 250 GENERAL PHARMACY W/ HCPCS (ALT 636 FOR OP/ED): Performed by: STUDENT IN AN ORGANIZED HEALTH CARE EDUCATION/TRAINING PROGRAM

## 2023-12-22 PROCEDURE — 97165 OT EVAL LOW COMPLEX 30 MIN: CPT | Mod: GO

## 2023-12-22 PROCEDURE — 1200000002 HC GENERAL ROOM WITH TELEMETRY DAILY

## 2023-12-22 PROCEDURE — 80202 ASSAY OF VANCOMYCIN: CPT | Performed by: STUDENT IN AN ORGANIZED HEALTH CARE EDUCATION/TRAINING PROGRAM

## 2023-12-22 PROCEDURE — 36415 COLL VENOUS BLD VENIPUNCTURE: CPT | Performed by: STUDENT IN AN ORGANIZED HEALTH CARE EDUCATION/TRAINING PROGRAM

## 2023-12-22 PROCEDURE — 2500000001 HC RX 250 WO HCPCS SELF ADMINISTERED DRUGS (ALT 637 FOR MEDICARE OP): Performed by: STUDENT IN AN ORGANIZED HEALTH CARE EDUCATION/TRAINING PROGRAM

## 2023-12-22 PROCEDURE — 80048 BASIC METABOLIC PNL TOTAL CA: CPT | Performed by: STUDENT IN AN ORGANIZED HEALTH CARE EDUCATION/TRAINING PROGRAM

## 2023-12-22 PROCEDURE — 97162 PT EVAL MOD COMPLEX 30 MIN: CPT | Mod: GP

## 2023-12-22 PROCEDURE — 2500000002 HC RX 250 W HCPCS SELF ADMINISTERED DRUGS (ALT 637 FOR MEDICARE OP, ALT 636 FOR OP/ED): Performed by: STUDENT IN AN ORGANIZED HEALTH CARE EDUCATION/TRAINING PROGRAM

## 2023-12-22 PROCEDURE — 85025 COMPLETE CBC W/AUTO DIFF WBC: CPT | Performed by: STUDENT IN AN ORGANIZED HEALTH CARE EDUCATION/TRAINING PROGRAM

## 2023-12-22 PROCEDURE — 96372 THER/PROPH/DIAG INJ SC/IM: CPT | Performed by: STUDENT IN AN ORGANIZED HEALTH CARE EDUCATION/TRAINING PROGRAM

## 2023-12-22 RX ORDER — SEVELAMER CARBONATE 800 MG/1
800 TABLET, FILM COATED ORAL
Status: DISCONTINUED | OUTPATIENT
Start: 2023-12-22 | End: 2023-12-26 | Stop reason: HOSPADM

## 2023-12-22 RX ORDER — FERROUS SULFATE 325(65) MG
1 TABLET ORAL
Status: DISCONTINUED | OUTPATIENT
Start: 2023-12-22 | End: 2023-12-26 | Stop reason: HOSPADM

## 2023-12-22 RX ORDER — TORSEMIDE 20 MG/1
40 TABLET ORAL DAILY
Status: DISCONTINUED | OUTPATIENT
Start: 2023-12-22 | End: 2023-12-26 | Stop reason: HOSPADM

## 2023-12-22 RX ORDER — VANCOMYCIN HYDROCHLORIDE 1 G/200ML
1000 INJECTION, SOLUTION INTRAVENOUS ONCE
Status: COMPLETED | OUTPATIENT
Start: 2023-12-22 | End: 2023-12-22

## 2023-12-22 RX ORDER — DOXAZOSIN 4 MG/1
4 TABLET ORAL DAILY
Status: DISCONTINUED | OUTPATIENT
Start: 2023-12-22 | End: 2023-12-26 | Stop reason: HOSPADM

## 2023-12-22 RX ORDER — SODIUM BICARBONATE 650 MG/1
650 TABLET ORAL 2 TIMES DAILY
Status: DISCONTINUED | OUTPATIENT
Start: 2023-12-22 | End: 2023-12-26 | Stop reason: HOSPADM

## 2023-12-22 RX ORDER — CARVEDILOL 12.5 MG/1
25 TABLET ORAL 2 TIMES DAILY
Status: DISCONTINUED | OUTPATIENT
Start: 2023-12-22 | End: 2023-12-26 | Stop reason: HOSPADM

## 2023-12-22 RX ORDER — FINASTERIDE 5 MG/1
5 TABLET, FILM COATED ORAL DAILY
Status: DISCONTINUED | OUTPATIENT
Start: 2023-12-22 | End: 2023-12-26 | Stop reason: HOSPADM

## 2023-12-22 RX ORDER — FLUTICASONE PROPIONATE 50 MCG
1 SPRAY, SUSPENSION (ML) NASAL DAILY
Status: DISCONTINUED | OUTPATIENT
Start: 2023-12-22 | End: 2023-12-26 | Stop reason: HOSPADM

## 2023-12-22 RX ORDER — CALCITRIOL 0.25 UG/1
0.25 CAPSULE ORAL DAILY
Status: DISCONTINUED | OUTPATIENT
Start: 2023-12-22 | End: 2023-12-26 | Stop reason: HOSPADM

## 2023-12-22 RX ADMIN — PIPERACILLIN SODIUM AND TAZOBACTAM SODIUM 3.38 G: 3; .375 INJECTION, SOLUTION INTRAVENOUS at 22:33

## 2023-12-22 RX ADMIN — TORSEMIDE 40 MG: 20 TABLET ORAL at 13:06

## 2023-12-22 RX ADMIN — CALCITRIOL 0.25 MCG: 0.25 CAPSULE, LIQUID FILLED ORAL at 17:16

## 2023-12-22 RX ADMIN — FERROUS SULFATE TAB 325 MG (65 MG ELEMENTAL FE) 1 TABLET: 325 (65 FE) TAB at 17:16

## 2023-12-22 RX ADMIN — SODIUM BICARBONATE 650 MG: 650 TABLET ORAL at 11:00

## 2023-12-22 RX ADMIN — PIPERACILLIN SODIUM AND TAZOBACTAM SODIUM 3.38 G: 3; .375 INJECTION, SOLUTION INTRAVENOUS at 09:56

## 2023-12-22 RX ADMIN — ACETAMINOPHEN 650 MG: 325 TABLET ORAL at 02:12

## 2023-12-22 RX ADMIN — FINASTERIDE 5 MG: 5 TABLET, FILM COATED ORAL at 13:07

## 2023-12-22 RX ADMIN — VANCOMYCIN HYDROCHLORIDE 1000 MG: 1 INJECTION, SOLUTION INTRAVENOUS at 19:59

## 2023-12-22 RX ADMIN — HEPARIN SODIUM 5000 UNITS: 5000 INJECTION INTRAVENOUS; SUBCUTANEOUS at 17:16

## 2023-12-22 RX ADMIN — FLUTICASONE PROPIONATE 1 SPRAY: 50 SPRAY, METERED NASAL at 13:07

## 2023-12-22 RX ADMIN — HEPARIN SODIUM 5000 UNITS: 5000 INJECTION INTRAVENOUS; SUBCUTANEOUS at 09:56

## 2023-12-22 RX ADMIN — SEVELAMER CARBONATE 800 MG: 800 TABLET, FILM COATED ORAL at 17:16

## 2023-12-22 RX ADMIN — DOXAZOSIN 4 MG: 4 TABLET ORAL at 13:06

## 2023-12-22 RX ADMIN — SODIUM CHLORIDE 10 ML/HR: 9 INJECTION, SOLUTION INTRAVENOUS at 09:55

## 2023-12-22 RX ADMIN — SODIUM BICARBONATE 650 MG: 650 TABLET ORAL at 20:00

## 2023-12-22 RX ADMIN — SODIUM CHLORIDE 10 ML/HR: 9 INJECTION, SOLUTION INTRAVENOUS at 22:32

## 2023-12-22 SDOH — ECONOMIC STABILITY: HOUSING INSECURITY: IN THE LAST 12 MONTHS, HOW MANY PLACES HAVE YOU LIVED?: 1

## 2023-12-22 SDOH — HEALTH STABILITY: MENTAL HEALTH: HOW OFTEN DO YOU HAVE 6 OR MORE DRINKS ON ONE OCCASION?: NEVER

## 2023-12-22 SDOH — HEALTH STABILITY: PHYSICAL HEALTH: ON AVERAGE, HOW MANY DAYS PER WEEK DO YOU ENGAGE IN MODERATE TO STRENUOUS EXERCISE (LIKE A BRISK WALK)?: 0 DAYS

## 2023-12-22 SDOH — ECONOMIC STABILITY: FOOD INSECURITY: WITHIN THE PAST 12 MONTHS, THE FOOD YOU BOUGHT JUST DIDN'T LAST AND YOU DIDN'T HAVE MONEY TO GET MORE.: NEVER TRUE

## 2023-12-22 SDOH — ECONOMIC STABILITY: INCOME INSECURITY: IN THE PAST 12 MONTHS, HAS THE ELECTRIC, GAS, OIL, OR WATER COMPANY THREATENED TO SHUT OFF SERVICE IN YOUR HOME?: NO

## 2023-12-22 SDOH — ECONOMIC STABILITY: TRANSPORTATION INSECURITY
IN THE PAST 12 MONTHS, HAS LACK OF TRANSPORTATION KEPT YOU FROM MEETINGS, WORK, OR FROM GETTING THINGS NEEDED FOR DAILY LIVING?: NO

## 2023-12-22 SDOH — ECONOMIC STABILITY: FOOD INSECURITY: WITHIN THE PAST 12 MONTHS, YOU WORRIED THAT YOUR FOOD WOULD RUN OUT BEFORE YOU GOT MONEY TO BUY MORE.: NEVER TRUE

## 2023-12-22 SDOH — HEALTH STABILITY: MENTAL HEALTH: HOW OFTEN DO YOU HAVE A DRINK CONTAINING ALCOHOL?: NEVER

## 2023-12-22 SDOH — ECONOMIC STABILITY: INCOME INSECURITY: IN THE LAST 12 MONTHS, WAS THERE A TIME WHEN YOU WERE NOT ABLE TO PAY THE MORTGAGE OR RENT ON TIME?: NO

## 2023-12-22 SDOH — HEALTH STABILITY: MENTAL HEALTH: HOW MANY STANDARD DRINKS CONTAINING ALCOHOL DO YOU HAVE ON A TYPICAL DAY?: PATIENT DOES NOT DRINK

## 2023-12-22 SDOH — SOCIAL STABILITY: SOCIAL NETWORK: IN A TYPICAL WEEK, HOW MANY TIMES DO YOU TALK ON THE PHONE WITH FAMILY, FRIENDS, OR NEIGHBORS?: NEVER

## 2023-12-22 SDOH — ECONOMIC STABILITY: TRANSPORTATION INSECURITY
IN THE PAST 12 MONTHS, HAS THE LACK OF TRANSPORTATION KEPT YOU FROM MEDICAL APPOINTMENTS OR FROM GETTING MEDICATIONS?: NO

## 2023-12-22 SDOH — HEALTH STABILITY: PHYSICAL HEALTH: ON AVERAGE, HOW MANY MINUTES DO YOU ENGAGE IN EXERCISE AT THIS LEVEL?: 0 MIN

## 2023-12-22 SDOH — ECONOMIC STABILITY: INCOME INSECURITY: HOW HARD IS IT FOR YOU TO PAY FOR THE VERY BASICS LIKE FOOD, HOUSING, MEDICAL CARE, AND HEATING?: NOT HARD AT ALL

## 2023-12-22 ASSESSMENT — PAIN - FUNCTIONAL ASSESSMENT: PAIN_FUNCTIONAL_ASSESSMENT: 0-10

## 2023-12-22 ASSESSMENT — COGNITIVE AND FUNCTIONAL STATUS - GENERAL
TURNING FROM BACK TO SIDE WHILE IN FLAT BAD: A LOT
HELP NEEDED FOR BATHING: A LITTLE
STANDING UP FROM CHAIR USING ARMS: A LITTLE
CLIMB 3 TO 5 STEPS WITH RAILING: A LOT
HELP NEEDED FOR BATHING: TOTAL
DRESSING REGULAR UPPER BODY CLOTHING: A LITTLE
MOVING FROM LYING ON BACK TO SITTING ON SIDE OF FLAT BED WITH BEDRAILS: A LOT
DAILY ACTIVITIY SCORE: 20
WALKING IN HOSPITAL ROOM: TOTAL
MOVING TO AND FROM BED TO CHAIR: A LOT
MOVING TO AND FROM BED TO CHAIR: A LITTLE
TOILETING: A LITTLE
DRESSING REGULAR UPPER BODY CLOTHING: A LITTLE
DRESSING REGULAR LOWER BODY CLOTHING: A LOT
WALKING IN HOSPITAL ROOM: A LOT
STANDING UP FROM CHAIR USING ARMS: A LOT
MOVING FROM LYING ON BACK TO SITTING ON SIDE OF FLAT BED WITH BEDRAILS: A LITTLE
TURNING FROM BACK TO SIDE WHILE IN FLAT BAD: A LITTLE
MOVING FROM LYING ON BACK TO SITTING ON SIDE OF FLAT BED WITH BEDRAILS: A LITTLE
MOVING TO AND FROM BED TO CHAIR: A LOT
HELP NEEDED FOR BATHING: A LOT
DRESSING REGULAR LOWER BODY CLOTHING: TOTAL
DRESSING REGULAR UPPER BODY CLOTHING: A LITTLE
TOILETING: A LOT
DAILY ACTIVITIY SCORE: 14
MOBILITY SCORE: 16
STANDING UP FROM CHAIR USING ARMS: A LOT
PERSONAL GROOMING: A LITTLE
MOBILITY SCORE: 12
DAILY ACTIVITIY SCORE: 16
EATING MEALS: A LITTLE
TOILETING: A LITTLE
TURNING FROM BACK TO SIDE WHILE IN FLAT BAD: A LOT
WALKING IN HOSPITAL ROOM: A LOT
DRESSING REGULAR LOWER BODY CLOTHING: A LITTLE
MOBILITY SCORE: 10
PERSONAL GROOMING: A LITTLE
CLIMB 3 TO 5 STEPS WITH RAILING: TOTAL
CLIMB 3 TO 5 STEPS WITH RAILING: TOTAL

## 2023-12-22 ASSESSMENT — PAIN SCALES - GENERAL
PAINLEVEL_OUTOF10: 0 - NO PAIN
PAINLEVEL_OUTOF10: 4

## 2023-12-22 ASSESSMENT — LIFESTYLE VARIABLES
SKIP TO QUESTIONS 9-10: 1
AUDIT-C TOTAL SCORE: 0

## 2023-12-22 ASSESSMENT — ACTIVITIES OF DAILY LIVING (ADL)
BATHING_ASSISTANCE: MAXIMAL
LACK_OF_TRANSPORTATION: NO

## 2023-12-22 NOTE — PROGRESS NOTES
"Vancomycin Dosing by Pharmacy- INITIAL    Joshua Gotti is a 89 y.o. year old male who Pharmacy has been consulted for vancomycin dosing for cellulitis, skin and soft tissue. Based on the patient's indication and renal status this patient will be dosed based on a goal trough/random level of 10-15.     Renal function is currently declining.    Visit Vitals  /63 (BP Location: Right arm, Patient Position: Lying)   Pulse 81   Temp 36 °C (96.8 °F)   Resp 14        Lab Results   Component Value Date    CREATININE 11.86 (H) 12/21/2023        Patient weight is No results found for: \"PTWEIGHT\"    No results found for: \"CULTURE\"     No intake/output data recorded.  [unfilled]    No results found for: \"PATIENTTEMP\"       Assessment/Plan     Patient has already been given a loading dose of 2000 mg.  Will initiate vancomycin maintenance, once patient decides on dialysis or vanco levels lower to level at which another dose is indicated  Follow-up level will be ordered on 12/22 in the am and at 12/22 at 6pm unless clinically indicated sooner.  Will continue to monitor renal function daily while on vancomycin and order serum creatinine at least every 48 hours if not already ordered.  Follow for continued vancomycin needs, clinical response, and signs/symptoms of toxicity.       Maranda Cordero RP       "

## 2023-12-22 NOTE — PROGRESS NOTES
Removed dressings from bilateral lower extremities. Wounds noted bilaterally with right being worse than left. Wound culture obtained and hand given to nurse caring for the patient. Small amount of drainage seen which is pink tinged. Tissue ranges from bright red to deep dark red. Some localized edema noted. Applied vashe wet to dry and secured with Jaziel base of toes to knee. Left lower extremity has open areas and there is no drainage seen at this time. Wound appearance is again goes from bright red to dep dark red. Applied Vashe wet to dry and secured with Jaziel from base of toes to knee.  Wound Care Progress Note     Visit Date: 12/22/2023      Patient Name: Joshua Gotti         MRN: [id number]                Reason for Visit: Wound Care BLE        Wound History: Chronic     Pertinent Labs:   Albumin   Date Value Ref Range Status   12/21/2023 2.9 (L) 3.4 - 5.0 g/dL Final           Wound Assessment:           NEW    External Urinary Catheter Male (Active)   Placement Date/Time: 12/21/23 2000   Hand Hygiene Completed: Yes  External Catheter Type: Male  External Urinary Catheter Sizes: Other (Comment)   Number of days: 0     External Urinary Catheter Male (Active)                   Wound 12/21/23 Pressure Injury Perineum (Active)   Date First Assessed/Time First Assessed: 12/21/23 2000   Present on Original Admission: Yes  Hand Hygiene Completed: Yes  Primary Wound Type: Pressure Injury  Location: Perineum   Number of days: 0       Wound 12/21/23 Venous Ulcer Pretibial Right (Active)   Date First Assessed/Time First Assessed: 12/21/23 2000   Present on Original Admission: Yes  Hand Hygiene Completed: Yes  Primary Wound Type: Venous Ulcer  Location: Pretibial  Wound Location Orientation: Right   Number of days: 0       Wound 12/21/23 Venous Ulcer Pretibial Left (Active)   Date First Assessed/Time First Assessed: 12/21/23 2000   Present on Original Admission: Yes  Hand Hygiene Completed: Yes  Primary Wound Type:  Venous Ulcer  Location: Pretibial  Wound Location Orientation: Left   Number of days: 0       Wound 12/22/23 Pressure Injury Buttocks Right (Active)   Date First Assessed/Time First Assessed: 12/22/23 1000   Present on Original Admission: Yes  Hand Hygiene Completed: Yes  Primary Wound Type: Pressure Injury  Location: Buttocks  Wound Location Orientation: Right   Number of days: 0       Wound 12/22/23 Pressure Injury Buttocks Left (Active)   Date First Assessed/Time First Assessed: 12/22/23 1000   Present on Original Admission: Yes  Hand Hygiene Completed: Yes  Primary Wound Type: Pressure Injury  Location: Buttocks  Wound Location Orientation: Left   Number of days: 0     Wound 12/21/23 Pressure Injury Perineum (Active)   Site Assessment Non-blanchable erythema;Dry 12/21/23 2027   Dressing Open to air 12/21/23 2027       Wound 12/21/23 Venous Ulcer Pretibial Right (Active)   Site Assessment Red;Swelling 12/22/23 1045   Drainage Description Sanguineous 12/22/23 1045   Dressing ABD 12/22/23 1045   Dressing Changed New 12/22/23 1045       Wound 12/21/23 Venous Ulcer Pretibial Left (Active)   Site Assessment Red;Swelling 12/22/23 1045   Drainage Description Sanguineous 12/22/23 1045   Dressing ABD 12/22/23 1045   Dressing Changed New 12/22/23 1045       Wound 12/22/23 Pressure Injury Buttocks Right (Active)   Site Assessment Red 12/22/23 1045   Pressure Injury Stage 2 12/22/23 1045   Drainage Amount None 12/22/23 1045   Dressing Open to air 12/22/23 1045       Wound 12/22/23 Pressure Injury Buttocks Left (Active)   Pressure Injury Stage 2 12/22/23 1045   Drainage Amount None 12/22/23 1045   Dressing Open to air 12/22/23 1045                   Wound Team Plan: Continue with Vashe wet to dry bid.     Long Rainey LPN  12/22/2023  12:02 PM

## 2023-12-22 NOTE — CARE PLAN
Problem: Nutrition  Goal: Oral intake greater 75%  Outcome: Not Progressing  Goal: Nutrition support is meeting 75% of nutrient needs  Outcome: Not Progressing  Goal: Lab values WNL  Outcome: Not Progressing  Goal: Electrolytes WNL  Outcome: Not Progressing     Problem: Skin  Goal: Decreased wound size/increased tissue granulation at next dressing change  Outcome: Not Progressing     Problem: Chronic Conditions and Co-morbidities  Goal: Patient's chronic conditions and co-morbidity symptoms are monitored and maintained or improved  Outcome: Not Progressing

## 2023-12-22 NOTE — PROGRESS NOTES
Physical Therapy    Physical Therapy Evaluation    Patient Name: Joshua Gotti  MRN: [id number]  Today's Date: 12/22/2023   Time Calculation  Start Time: 0822  Stop Time: 0840  Time Calculation (min): 18 min    Assessment/Plan   PT Assessment  PT Assessment Results: Decreased strength, Decreased range of motion, Decreased mobility, Impaired balance  Rehab Prognosis: Fair  Barriers to Discharge: Spontaneous leg bleeding, poor activity tolerance  End of Session Communication: Bedside nurse  Assessment Comment: Pt limited by R LE pain and bleeding when sitting EOB. Requires Assist x2, unable to safely return home at this time  End of Session Patient Position: Bed, 3 rail up, Alarm on  IP OR SWING BED PT PLAN  Inpatient or Swing Bed: Inpatient  PT Plan  Treatment/Interventions: Bed mobility, Gait training, Transfer training, Balance training  PT Plan: Skilled PT  PT Frequency: 3 times per week  PT Discharge Recommendations: Moderate intensity level of continued care  Equipment Recommended upon Discharge: Wheeled walker  PT Recommended Transfer Status: Assist x2  PT - OK to Discharge: Yes (PT eval completed and POC in place. Defer d/c to MD. Not safe to return home)    Subjective     Current Problem:  Patient Active Problem List   Diagnosis    Cellulitis    Cellulitis of both lower extremities       General Visit Information:  General  Reason for Referral: impaired mobility  Referred By: Minesh (PT/OT 12/21)  Past Medical History Relevant to Rehab: HLD, HTN, CKD  Family/Caregiver Present: No  Co-Treatment: OT  Co-Treatment Reason: Maximize pt safety and mobility  Prior to Session Communication: PCT/NA/CTA  Patient Position Received: Bed, 3 rail up  General Comment: Pt admitted to ED wtih B/L LE wounds, R LE wound has been present for several months. Elevated Creatinine, recommend dialysis, pt refusing. CXR- negative, Hgb 8.9, Creatinine 11.86,     Home Living:  Home Living  Type of Home: House  Lives With: Alone  Home  Living Comments: Pt lives alone in one story home with 4 steps with one HR to enter. Owns SC.    Prior Level of Function:  Prior Function Per Pt/Caregiver Report  Prior Function Comments: Pt reports ambulating wtih SC. Independent with ADLs and Assist iwth IADLs from Daughter. Denies falls. Drives, but has not for last month.    Precautions:  Precautions  Hearing/Visual Limitations: [place]; reports glasses, not in room  Medical Precautions: Fall precautions    Vital Signs:     Objective     Pain:  Pain Assessment  Pain Assessment: 0-10  Pain Score:  (R LE > L LE with mobility, Not rated)    Cognition:  Cognition  Overall Cognitive Status: Within Functional Limits (mildly confused but A & O x4)    General Assessments:  General Observation  General Observation: IV, tele, purewick      Sensation  Sensation Comment: WFL  with light touch, + neuropathy  Strength  Strength Comments: B/L hip flex, knee ext >3/5 MMT testeed functionally, R DF >3/5, L DF 4-/5 MMT        Postural Control  Posture Comment: Forward flexed posture  Static Sitting Balance  Static Sitting-Comment/Number of Minutes: Fair  Dynamic Sitting Balance  Dynamic Sitting-Comments: Fair -       Functional Assessments:     Bed Mobility  Bed Mobility: Yes  Bed Mobility 1  Bed Mobility Comments 1: MOd A x2 with sup to sit, max A x2 with sit to sup this date. Pt able to intiate bed mobility, seated EOB with Lateral lean initially, tolerates approx 2 minutes when B/L LE wounds begin bleeding and dripping on to floor. Notified nursing immediately. Return to supine wtih LE elevated.  Transfers  Transfer: No (unable to perform this date due to bleeding.)             Extremity/Trunk Assessments:  RUE   RUE : Within Functional Limits (ROM)  LUE   LUE: Within Functional Limits (ROM)  RLE   RLE : Within Functional Limits (ROM, LE redness on lower leg increased size on R LE, hamstring tighness noted)  LLE   LLE : Within Functional Limits (ROM, LE redness on lower leg,  hamstring tightness noted)    Outcome Measures:  [facility] Basic Mobility  Turning from your back to your side while in a flat bed without using bedrails: A lot  Moving from lying on your back to sitting on the side of a flat bed without using bedrails: A lot  Moving to and from bed to chair (including a wheelchair): A lot  Standing up from a chair using your arms (e.g. wheelchair or bedside chair): A lot  To walk in hospital room: Total  Climbing 3-5 steps with railing: Total  Basic Mobility - Total Score: 10          Goals:  Encounter Problems       Encounter Problems (Active)       PT Problem       Pt will demonstrate min A with all bed mobility   (Progressing)       Start:  12/22/23    Expected End:  01/05/24            Pt will demonstrate sit to stand and bed/chair transfers with Mod A with WW.   (Progressing)       Start:  12/22/23    Expected End:  01/05/24            Pt will tolerate 15 reps x2 LE seated/supine therapeutic exercise for LE strengthening and endurance  (Progressing)       Start:  12/22/23    Expected End:  01/05/24            pt will tolerate sitting EOB >5 minutes with SBA (Progressing)       Start:  12/22/23    Expected End:  01/05/24               Pain - Adult            Education Documentation  Mobility Training, taught by Linda Thornton, PT at 12/22/2023 12:51 PM.  Learner: Patient  Readiness: Acceptance  Method: Explanation  Response: Verbalizes Understanding  Comment: Educated on safety wtih mobility

## 2023-12-22 NOTE — PROGRESS NOTES
"Occupational Therapy    Evaluation/Treatment    Patient Name: Joshua Gotti \"Rohit"  MRN: [id number]  : 1933  Today's Date: 23  Time Calculation  Start Time: 823  Stop Time: 842  Time Calculation (min): 19 min       Assessment:  End of Session Communication: Bedside nurse  End of Session Patient Position: Bed, 3 rail up, Alarm on (call light in reach)  OT Assessment Results: Decreased ADL status, Decreased cognition, Decreased endurance, Decreased functional mobility    Plan:  Treatment Interventions: ADL retraining, Functional transfer training, Endurance training  OT Frequency: 2 times per week  OT Discharge Recommendations: Moderate intensity level of continued care  Equipment Recommended upon Discharge: Wheeled walker  OT - OK to Discharge: Yes  Treatment Interventions: ADL retraining, Functional transfer training, Endurance training  Subjective     Current Problem:  1. Cellulitis of both lower extremities        2. ESRD (end stage renal disease) (CMS/[facility])        3. Elevated troponin        4. Normocytic anemia          History reviewed. No pertinent past medical history.  History reviewed. No pertinent surgical history.    General:   OT Received On: 23  General  Reason for Referral: ADL impairment  Referred By: Minesh (PT/OT )  Past Medical History Relevant to Rehab: HLD, HTN, CKD  Family/Caregiver Present: No  Patient Position Received: Bed, 3 rail up  General Comment: Pt admitted to ED wtih B/L LE wounds, R LE wound has been present for several months. Elevated Creatinine, recommend dialysis, pt refusing. CXR- negative, Hgb 8.9, Creatinine 11.86, . When therapist assisted pt to sitting at EOB, B LE's began draining blood from wounds. Returned to supine, RN notified of drainage    Precautions:  Hearing/Visual Limitations: [place]; reports glasses, not in room  Medical Precautions: Fall precautions           Pain:  Pain Assessment  Pain Score:  (R LE pain worse, tender to touch, L LE " also painful, did not rate. pain from shins to feet)  Objective     Cognition:  Overall Cognitive Status: Within Functional Limits (mild confusion, however oriented x 4)             Home Living:  Home Living Comments: Pt lives alone in one story home with 4 steps with one HR to enter. Owns SC.    Prior Function:  Prior Function Comments: Pt reports ambulating wtih SC. Independent with ADLs and Assist iwth IADLs from Daughter. Denies falls. Drives, but has not for last month.           Activities of Daily Living:   Eating Assistance: Stand by  Grooming Assistance: Stand by  Bathing Assistance: Maximal  UE Dressing Assistance: Moderate  LE Dressing Assistance: Total  Toileting Assistance with Device: Total  Functional Assistance:  (amb NT due to B LE bloody drainage at EOB)                         Activity Tolerance:  Endurance: Decreased tolerance for upright activites           Bed Mobility/Transfers: Bed Mobility  Bed Mobility:  (sup to sit mod A to scoot to EOB, sit to sup max A x 2)  Transfers  Transfer: No                Balance:  Static Sitting: good  Dynamic Sitting: fair    Sensation:  Sensation Comment: WFL  with light touch    Strength:  Strength Comments: B UE 4/5 throughout            Extremities: RUE   RUE : Within Functional Limits and LUE   LUE: Within Functional Limits    Outcome Measures: [facility] Daily Activity  Putting on and taking off regular lower body clothing: Total  Bathing (including washing, rinsing, drying): Total  Putting on and taking off regular upper body clothing: A little  Toileting, which includes using toilet, bedpan or urinal: A lot  Taking care of personal grooming such as brushing teeth: A little  Eating Meals: None  Daily Activity - Total Score: 14    Education Documentation  ADL Training, taught by Tahira Talbot OT at 12/22/2023 12:59 PM.  Learner: Patient  Readiness: Acceptance  Method: Explanation  Response: Verbalizes Understanding, Needs Reinforcement                Goals:  Encounter Problems       Encounter Problems (Active)       OT Goals       Pt will dress UB with SBA (Progressing)       Start:  12/22/23    Expected End:  01/05/24            Pt will transfer to bed, chair, toilet with min A  (Progressing)       Start:  12/22/23    Expected End:  01/05/24            Pt will demo good dyn sitting balance at EOB (Progressing)       Start:  12/22/23    Expected End:  01/05/24

## 2023-12-22 NOTE — PROGRESS NOTES
Medical Group Progress Note  ASSESSMENT & PLAN:     Bilateral lower extremity cellulitis  - Presented from home with worsening lower extremity erythema, swelling, drainage, pain over the past few months, ESR and CRP significantly elevated  - Refer to history and physical for image, no changes today  - Will continue vancomycin and Zosyn at this time  - ID consulted  - Wound care following  - Wound cultures collected today 12/22     CKD stage V  - Renal function at baseline  - Patient is declining hemodialysis at this time     Essential hypertension  BPH  Hyperlipidemia  Diastolic CHF  - Resume home medications once reconciled     Anemia of chronic disease  - Hemoglobin 7.3 (baseline not available as [facility])     Elevated troponin  - Troponin 171, 158 likely in setting of CKD stage V  - No chest pain, low concerns for ACS with no EKG changes as well     VTE prophylaxis: Heparin subcutaneous    Disposition/Daily update: Patient's renal function remains at baseline.  Worked with PT/OT.  Wound culture collected today.  Had a discussion with patient's daughter at bedside today, plan for likely discharge to SNF once able; aware that he would be appropriate for hospice if renal function continues to decline.  Appreciate recommendations from ID.      ---Of note, this documentation is completed using the Dragon Dictation system (voice recognition software). There may be spelling and/or grammatical errors that were not corrected prior to final submission.---      Matthew Edge MD    SUBJECTIVE     Patient was seen and examined at bedside this morning.  He had some bleeding with his wounds with PT/OT.  Otherwise he had no complaints.  Spoke with his daughter at bedside.    OBJECTIVE:     Last Recorded Vitals:  Vitals:    12/21/23 1926 12/21/23 2013 12/22/23 0208 12/22/23 0742   BP: 125/63  128/63 112/57   BP Location: Right arm   Left arm   Patient Position: Lying   Lying   Pulse: 81  80 70   Resp: 14   16  "  Temp: 36 °C (96.8 °F)  37.2 °C (99 °F) 37.5 °C (99.5 °F)   TempSrc:    Temporal   SpO2: 99%  96% 97%   Weight: 62.1 kg (136 lb 14.5 oz) 62.1 kg (136 lb 14.5 oz)     Height:  1.778 m (5' 10\")       Last I/O:  I/O last 3 completed shifts:  In: 50 (0.8 mL/kg) [IV Piggyback:50]  Out: - (0 mL/kg)   Weight: 62.1 kg     Physical Exam  Vitals reviewed.   Constitutional:       General: He is not in acute distress.     Appearance: Normal appearance. He is normal weight. He is not ill-appearing.   HENT:      Head: Normocephalic and atraumatic.   Eyes:      Extraocular Movements: Extraocular movements intact.      Conjunctiva/sclera: Conjunctivae normal.   Cardiovascular:      Rate and Rhythm: Normal rate and regular rhythm.      Pulses: Normal pulses.      Heart sounds: Normal heart sounds.   Pulmonary:      Effort: Pulmonary effort is normal.      Breath sounds: Normal breath sounds.   Abdominal:      General: Abdomen is flat. Bowel sounds are normal. There is no distension.      Palpations: Abdomen is soft.      Tenderness: There is no abdominal tenderness. There is no guarding.   Musculoskeletal:         General: Normal range of motion.      Cervical back: Normal range of motion and neck supple.      Right lower leg: Edema present.      Left lower leg: Edema present.   Skin:     General: Skin is warm.      Findings: Erythema and rash present.      Comments: See media section or history and physical for lower extremity wounds, unchanged today   Neurological:      General: No focal deficit present.      Mental Status: He is alert and oriented to person, place, and time. Mental status is at baseline.     Inpatient Medications:  calcitriol, 0.25 mcg, oral, Daily  carvedilol, 25 mg, oral, BID  doxazosin, 4 mg, oral, Daily  ferrous sulfate (325 mg ferrous sulfate), 1 tablet, oral, BID with meals  finasteride, 5 mg, oral, Daily  fluticasone, 1 spray, Each Nostril, Daily  heparin (porcine), 5,000 Units, subcutaneous, " q8h  piperacillin-tazobactam-dextrose, 3.375 g, intravenous, q12h   And  sodium chloride 0.9%, 10 mL/hr, intravenous, q12h  sevelamer carbonate, 800 mg, oral, TID with meals  sodium bicarbonate, 650 mg, oral, BID  torsemide, 40 mg, oral, Daily    PRN Medications  PRN medications: acetaminophen **OR** acetaminophen **OR** acetaminophen, polyethylene glycol, vancomycin  Continuous Medications:     LABS AND IMAGING:     Labs:  Results from last 7 days   Lab Units 12/22/23  0753 12/21/23  1535   WBC AUTO x10*3/uL 7.3 11.2   RBC AUTO x10*6/uL 2.49* 3.01*   HEMOGLOBIN g/dL 7.3* 8.9*   HEMATOCRIT % 22.4* 27.7*   MCV fL 90 92   MCH pg 29.3 29.6   MCHC g/dL 32.6 32.1   RDW % 14.3 14.2   PLATELETS AUTO x10*3/uL 178 220     Results from last 7 days   Lab Units 12/22/23  0753 12/21/23  1535   SODIUM mmol/L 137 138   POTASSIUM mmol/L 5.1 5.3   CHLORIDE mmol/L 101 97*   CO2 mmol/L 18* 18*   BUN mg/dL 195* 201*   CREATININE mg/dL 12.09* 11.86*   GLUCOSE mg/dL 92 160*   PROTEIN TOTAL g/dL  --  7.7   CALCIUM mg/dL 7.8* 8.3*   BILIRUBIN TOTAL mg/dL  --  0.4   ALK PHOS U/L  --  77   AST U/L  --  8*   ALT U/L  --  6*         Results from last 7 days   Lab Units 12/21/23  1618 12/21/23  1535   TROPHS ng/L 158* 171*     Imaging:  ECG 12 lead  Sinus rhythm with 1st degree AV block  Incomplete left bundle branch block  ST & T wave abnormality, consider lateral ischemia  Abnormal ECG  No previous ECGs available    See ED provider note for full interpretation and clinical correlation  XR chest 1 view  Narrative: Interpreted By:  Noris Erickson,   STUDY:  XR CHEST 1 VIEW;  12/21/2023 4:10 pm      INDICATION:  Signs/Symptoms:peripheral edema.      COMPARISON:  None.      ACCESSION NUMBER(S):  [id number]      ORDERING CLINICIAN:  ERIC GREGG      FINDINGS:  Soft tissue fold overlies and obscures the central upper chest.  Patient is slightly rotated. Reverse lordotic projection obtained. No  definite focal infiltrate, pleural effusion or  pneumothorax as  visualized. The cardiac silhouette is mildly enlarged. Aortic  tortuosity.      Impression: Mildly enlarged cardiac silhouette. No definite focal infiltrate.      MACRO:  None.      Signed by: Noris Erickson 12/21/2023 4:32 PM  Dictation workstation:   [id number]

## 2023-12-22 NOTE — PROGRESS NOTES
"Vancomycin Dosing by Pharmacy- FOLLOW UP    Joshua Gotit is a 89 y.o. year old male who Pharmacy has been consulted for vancomycin dosing for osteomyelitis/septic arthritis. Based on the patient's indication and renal status this patient is being dosed based on a goal trough/random level of 10-15.     Renal function is currently declining.    Current vancomycin dose: dosing per level - Vancomycin 2 gm given 12/21 at 1635    Most recent random level: 14.2 mcg/mL    Visit Vitals  /57 (BP Location: Left arm, Patient Position: Lying)   Pulse 70   Temp 37.5 °C (99.5 °F) (Temporal)   Resp 16        Lab Results   Component Value Date    CREATININE 12.09 (H) 12/22/2023    CREATININE 11.86 (H) 12/21/2023        Patient weight is No results found for: \"PTWEIGHT\"    No results found for: \"CULTURE\"     I/O last 3 completed shifts:  In: 50 (0.8 mL/kg) [IV Piggyback:50]  Out: - (0 mL/kg)   Weight: 62.1 kg   [unfilled]    No results found for: \"PATIENTTEMP\"     Assessment/Plan    Level within normal limit.  Will redose with Vancomycin 1 gm once 12/22 at 2000.        The next level will be obtained on 12/23  at 1900. May be obtained sooner if clinically indicated.   2. Will continue to monitor renal function daily while on vancomycin and order serum creatinine at least every 48 hours if not already ordered.  3. Follow for continued vancomycin needs, clinical response, and signs/symptoms of toxicity.       Kristan Glvoer, [facility]           "

## 2023-12-22 NOTE — PROGRESS NOTES
12/22/23 1530   Discharge Planning   Home or Post Acute Services Post acute facilities (Rehab/SNF/etc)   Type of Post Acute Facility Services Rehab;Skilled nursing   Patient expects to be discharged to: [facility]     Revisited with pt/daughter, SNF choice is [facility] hospital. Referral built and sent to Critical [facility] hospital, awaiting acceptance.

## 2023-12-22 NOTE — CONSULTS
"Nutrition Initial Assessment:   Nutrition Assessment         Patient is a 89 y.o. male presenting with: cellulitis      Nutrition History:  Energy Intake: Fair 50-75 %  Food and Nutrient History: Currently on a regular diet. Per EMR, has a history of CKD stage 5, refusing HD. GFR currently 4. Currently is a poor historian. Able to tell RD that likes reyes and eggs, green beans. Though unable to state if has other foods or any oral nutritional supplements at home. Has severe muscle and fat losses, suspect is not meeting nutritional needs. Will send 1 Ensure per day at dinner time.  Vitamin/Herbal Supplement Use: vitamin D, 1000 mcg/day vitamin B12, 325 mg ferrous sulfate  Food Allergies/Intolerances:  None  GI Symptoms: None  Oral Problems:  pt missing teeth, denies chewing/swallowing concerns       Anthropometrics:  Height: 177.8 cm (5' 10\")   Weight: 62.1 kg (136 lb 14.5 oz)   BMI (Calculated): 19.64             Weight Change %:  Weight History / % Weight Change: 11/4/22: 78.9 kg; 3/8/23: 84.4 kg; 12/21/23: 62.1 kg  Significant Weight Loss: Yes  Interpretation of Weight Loss: >20% in 1 year (26.4% x 9 months (assuming all weights are accurate))    Nutrition Focused Physical Exam Findings:    Subcutaneous Fat Loss:   Orbital Fat Pads: Severe (dark circles, hollowing and loose skin)  Buccal Fat Pads: Severe (hollow, sunken and narrow face)  Triceps: Severe (negligible fat tissue)  Ribs: Defer  Muscle Wasting:  Temporalis: Severe (hollowed scooping depression)  Pectoralis (Clavicular Region): Severe (protruding prominent clavicle)  Deltoid/Trapezius: Severe (squared shoulders, acromion process prominent)  Interosseous: Severe (depressed area between thumb and forefinger)  Trapezius/Infraspinatus/Supraspinatus (Scapular Region): Defer  Quadriceps: Defer  Gastrocnemius: Defer  Edema:  Edema: +2 mild  Edema Location: RLE per nursing assessment  Physical Findings:  Skin: Positive (venous ulcers, stage 2 bilateral " buttocks)    Nutrition Significant Labs:  BMP Trend:   Results from last 7 days   Lab Units 12/22/23  0753 12/21/23  1535   GLUCOSE mg/dL 92 160*   CALCIUM mg/dL 7.8* 8.3*   SODIUM mmol/L 137 138   POTASSIUM mmol/L 5.1 5.3   CO2 mmol/L 18* 18*   CHLORIDE mmol/L 101 97*   BUN mg/dL 195* 201*   CREATININE mg/dL 12.09* 11.86*        Nutrition Specific Medications: Reviewed      I/O:    ;          Dietary Orders (From admission, onward)       Start     Ordered    12/21/23 2029  May Participate in Room Service With Assistance  Once        Question:  .  Answer:  Yes    12/21/23 2029 12/21/23 1946  Adult diet Regular  Diet effective now        Question:  Diet type  Answer:  Regular    12/21/23 1945                     Estimated Needs:   Total Energy Estimated Needs (kCal): 1950 kCal  Method for Estimating Needs: 1123-3128 kcal (30-35 kcal/kg)  Total Protein Estimated Needs (g): 56 g  Method for Estimating Needs: 38-75g (0.6-1.2 g/kg or as renal function allows)  Total Fluid Estimated Needs (mL): 1950 mL  Method for Estimating Needs: 1 ml/kcal or per MD        Nutrition Diagnosis   Malnutrition Diagnosis  Patient has Malnutrition Diagnosis: Yes  Diagnosis Status: New  Malnutrition Diagnosis: Severe malnutrition related to chronic disease or condition  As Evidenced by: >20% wt loss x 1 month, severe muscle losses, severe fat losses            Nutrition Interventions/Recommendations         Nutrition Prescription:  Individualized Nutrition Prescription Provided for : Regular diet. Ensure once daily.        Nutrition Interventions:   Food and/or Nutrient Delivery Interventions  Interventions: Meals and snacks, Medical food supplement  Goal: Consumes 3 meals per day  Medical Food Supplement: Commercial beverage  Goal: Ensure Plus High Protein once daily (provides 350 kcal, 20 g protein per serving)    Coordination of Nutrition Care by a Nutrition Professional  Collaboration and Referral of Nutrition Care: Collaboration by  nutrition professional with other providers (RN)    Nutrition Education:      Not appropriate for education at this time.    Nutrition Monitoring and Evaluation   Food/Nutrient Related History Monitoring  Monitoring and Evaluation Plan: Energy intake, Amount of food  Energy Intake: Estimated energy intake  Criteria: Pt meets >75% of estimated energy needs  Amount of Food: Estimated amout of food, Medical food intake  Criteria: Pt consumes >75% of meals and supplements    Body Composition/Growth/Weight History  Monitoring and Evaluation Plan: Weight  Weight: Measured weight  Criteria: Maintains stable weight    Biochemical Data, Medical Tests and Procedures  Monitoring and Evaluation Plan: Glucose/endocrine profile, Electrolyte/renal panel  Criteria: WNL  Glucose/Endocrine Profile: Glucose, casual  Criteria: BG within desirable range    Time Spent/Follow-up Reminder:   Time Spent (min): 45 minutes  Last Date of Nutrition Visit: 12/22/23  Nutrition Follow-Up Needed?: 3-5 days

## 2023-12-22 NOTE — PROGRESS NOTES
12/22/23 0905   Discharge Planning   Living Arrangements Alone   Support Systems Children  (daughter lives about 15 miles away)   Assistance Needed PTA lives alone , independent with cane, drove up until recently-daughter drives, denies falls   Type of Residence Private residence  (2 level home, bedroom/bathroom on 1st floor)   Number of Stairs to Enter Residence 4  (with handrail)   Number of Stairs Within Residence 13   Do you have animals or pets at home? No   Home or Post Acute Services Post acute facilities (Rehab/SNF/etc)   Type of Post Acute Facility Services Skilled nursing;Rehab   Patient expects to be discharged to: SNF   Does the patient need discharge transport arranged? Yes   RoundTrip coordination needed? Yes   Financial Resource Strain   How hard is it for you to pay for the very basics like food, housing, medical care, and heating? Not hard   Housing Stability   In the last 12 months, was there a time when you were not able to pay the mortgage or rent on time? N   In the last 12 months, how many places have you lived? 1   In the last 12 months, was there a time when you did not have a steady place to sleep or slept in a shelter (including now)? N   Transportation Needs   In the past 12 months, has lack of transportation kept you from medical appointments or from getting medications? no   In the past 12 months, has lack of transportation kept you from meetings, work, or from getting things needed for daily living? No     Pt 89 year old male from home alone, states was Ind with cane PTA, currently admitted with bilateral worsening lower extremity wounds and swelling, pt has ESRD stage V and refuses Hemodialysis. Pt anticipates may need to go to SNF upon DC, awaiting PT/OT evals to aide in DC planning. Pt gives permission to speak with his daughter also. [facility] and hand delivered to pt. CT team will follow up for SNF choice.

## 2023-12-23 LAB
ABO GROUP (TYPE) IN BLOOD: NORMAL
ABO GROUP (TYPE) IN BLOOD: NORMAL
ANION GAP SERPL CALC-SCNC: 24 MMOL/L (ref 10–20)
ANTIBODY SCREEN: NORMAL
BASOPHILS # BLD AUTO: 0.01 X10*3/UL (ref 0–0.1)
BASOPHILS NFR BLD AUTO: 0.2 %
BUN SERPL-MCNC: 184 MG/DL (ref 6–23)
CALCIUM SERPL-MCNC: 7.6 MG/DL (ref 8.6–10.3)
CHLORIDE SERPL-SCNC: 100 MMOL/L (ref 98–107)
CO2 SERPL-SCNC: 18 MMOL/L (ref 21–32)
CREAT SERPL-MCNC: 12.19 MG/DL (ref 0.5–1.3)
EOSINOPHIL # BLD AUTO: 0.04 X10*3/UL (ref 0–0.4)
EOSINOPHIL NFR BLD AUTO: 0.6 %
ERYTHROCYTE [DISTWIDTH] IN BLOOD BY AUTOMATED COUNT: 14.1 % (ref 11.5–14.5)
GFR SERPL CREATININE-BSD FRML MDRD: 4 ML/MIN/1.73M*2
GLUCOSE SERPL-MCNC: 91 MG/DL (ref 74–99)
HCT VFR BLD AUTO: 22.3 % (ref 41–52)
HGB BLD-MCNC: 7.2 G/DL (ref 13.5–17.5)
IMM GRANULOCYTES # BLD AUTO: 0.07 X10*3/UL (ref 0–0.5)
IMM GRANULOCYTES NFR BLD AUTO: 1.1 % (ref 0–0.9)
LYMPHOCYTES # BLD AUTO: 0.49 X10*3/UL (ref 0.8–3)
LYMPHOCYTES NFR BLD AUTO: 7.9 %
MCH RBC QN AUTO: 29.1 PG (ref 26–34)
MCHC RBC AUTO-ENTMCNC: 32.3 G/DL (ref 32–36)
MCV RBC AUTO: 90 FL (ref 80–100)
MONOCYTES # BLD AUTO: 0.46 X10*3/UL (ref 0.05–0.8)
MONOCYTES NFR BLD AUTO: 7.4 %
NEUTROPHILS # BLD AUTO: 5.17 X10*3/UL (ref 1.6–5.5)
NEUTROPHILS NFR BLD AUTO: 82.8 %
NRBC BLD-RTO: 0 /100 WBCS (ref 0–0)
PLATELET # BLD AUTO: 179 X10*3/UL (ref 150–450)
POTASSIUM SERPL-SCNC: 5.2 MMOL/L (ref 3.5–5.3)
RBC # BLD AUTO: 2.47 X10*6/UL (ref 4.5–5.9)
RH FACTOR (ANTIGEN D): NORMAL
RH FACTOR (ANTIGEN D): NORMAL
SODIUM SERPL-SCNC: 137 MMOL/L (ref 136–145)
VANCOMYCIN SERPL-MCNC: 20.6 UG/ML (ref 5–20)
WBC # BLD AUTO: 6.2 X10*3/UL (ref 4.4–11.3)

## 2023-12-23 PROCEDURE — 85025 COMPLETE CBC W/AUTO DIFF WBC: CPT | Performed by: STUDENT IN AN ORGANIZED HEALTH CARE EDUCATION/TRAINING PROGRAM

## 2023-12-23 PROCEDURE — 96372 THER/PROPH/DIAG INJ SC/IM: CPT | Performed by: STUDENT IN AN ORGANIZED HEALTH CARE EDUCATION/TRAINING PROGRAM

## 2023-12-23 PROCEDURE — 80202 ASSAY OF VANCOMYCIN: CPT | Performed by: PHARMACIST

## 2023-12-23 PROCEDURE — 86901 BLOOD TYPING SEROLOGIC RH(D): CPT | Performed by: STUDENT IN AN ORGANIZED HEALTH CARE EDUCATION/TRAINING PROGRAM

## 2023-12-23 PROCEDURE — 80048 BASIC METABOLIC PNL TOTAL CA: CPT | Performed by: STUDENT IN AN ORGANIZED HEALTH CARE EDUCATION/TRAINING PROGRAM

## 2023-12-23 PROCEDURE — 97530 THERAPEUTIC ACTIVITIES: CPT | Mod: GP,CQ | Performed by: PHYSICAL THERAPY ASSISTANT

## 2023-12-23 PROCEDURE — 2500000004 HC RX 250 GENERAL PHARMACY W/ HCPCS (ALT 636 FOR OP/ED): Performed by: STUDENT IN AN ORGANIZED HEALTH CARE EDUCATION/TRAINING PROGRAM

## 2023-12-23 PROCEDURE — 36415 COLL VENOUS BLD VENIPUNCTURE: CPT | Performed by: STUDENT IN AN ORGANIZED HEALTH CARE EDUCATION/TRAINING PROGRAM

## 2023-12-23 PROCEDURE — 1200000002 HC GENERAL ROOM WITH TELEMETRY DAILY

## 2023-12-23 PROCEDURE — 2500000001 HC RX 250 WO HCPCS SELF ADMINISTERED DRUGS (ALT 637 FOR MEDICARE OP): Performed by: STUDENT IN AN ORGANIZED HEALTH CARE EDUCATION/TRAINING PROGRAM

## 2023-12-23 PROCEDURE — 99232 SBSQ HOSP IP/OBS MODERATE 35: CPT | Performed by: STUDENT IN AN ORGANIZED HEALTH CARE EDUCATION/TRAINING PROGRAM

## 2023-12-23 RX ORDER — TRAMADOL HYDROCHLORIDE 50 MG/1
50 TABLET ORAL EVERY 12 HOURS PRN
Status: DISCONTINUED | OUTPATIENT
Start: 2023-12-23 | End: 2023-12-26 | Stop reason: HOSPADM

## 2023-12-23 RX ADMIN — ACETAMINOPHEN 650 MG: 325 TABLET ORAL at 09:29

## 2023-12-23 RX ADMIN — SEVELAMER CARBONATE 800 MG: 800 TABLET, FILM COATED ORAL at 12:15

## 2023-12-23 RX ADMIN — SEVELAMER CARBONATE 800 MG: 800 TABLET, FILM COATED ORAL at 09:30

## 2023-12-23 RX ADMIN — SODIUM CHLORIDE 10 ML/HR: 9 INJECTION, SOLUTION INTRAVENOUS at 21:06

## 2023-12-23 RX ADMIN — TORSEMIDE 40 MG: 20 TABLET ORAL at 09:30

## 2023-12-23 RX ADMIN — SODIUM CHLORIDE 10 ML/HR: 9 INJECTION, SOLUTION INTRAVENOUS at 12:16

## 2023-12-23 RX ADMIN — FERROUS SULFATE TAB 325 MG (65 MG ELEMENTAL FE) 1 TABLET: 325 (65 FE) TAB at 17:14

## 2023-12-23 RX ADMIN — FERROUS SULFATE TAB 325 MG (65 MG ELEMENTAL FE) 1 TABLET: 325 (65 FE) TAB at 09:29

## 2023-12-23 RX ADMIN — ACETAMINOPHEN 650 MG: 325 TABLET ORAL at 20:49

## 2023-12-23 RX ADMIN — SODIUM BICARBONATE 650 MG: 650 TABLET ORAL at 09:30

## 2023-12-23 RX ADMIN — PIPERACILLIN SODIUM AND TAZOBACTAM SODIUM 3.38 G: 3; .375 INJECTION, SOLUTION INTRAVENOUS at 21:05

## 2023-12-23 RX ADMIN — HEPARIN SODIUM 5000 UNITS: 5000 INJECTION INTRAVENOUS; SUBCUTANEOUS at 02:03

## 2023-12-23 RX ADMIN — HEPARIN SODIUM 5000 UNITS: 5000 INJECTION INTRAVENOUS; SUBCUTANEOUS at 09:31

## 2023-12-23 RX ADMIN — DOXAZOSIN 4 MG: 4 TABLET ORAL at 09:29

## 2023-12-23 RX ADMIN — CARVEDILOL 25 MG: 12.5 TABLET, FILM COATED ORAL at 09:30

## 2023-12-23 RX ADMIN — CARVEDILOL 25 MG: 12.5 TABLET, FILM COATED ORAL at 20:50

## 2023-12-23 RX ADMIN — TRAMADOL HYDROCHLORIDE 50 MG: 50 TABLET, COATED ORAL at 12:15

## 2023-12-23 RX ADMIN — ACETAMINOPHEN 650 MG: 325 TABLET ORAL at 03:10

## 2023-12-23 RX ADMIN — SODIUM BICARBONATE 650 MG: 650 TABLET ORAL at 20:51

## 2023-12-23 RX ADMIN — HEPARIN SODIUM 5000 UNITS: 5000 INJECTION INTRAVENOUS; SUBCUTANEOUS at 17:14

## 2023-12-23 RX ADMIN — PIPERACILLIN SODIUM AND TAZOBACTAM SODIUM 3.38 G: 3; .375 INJECTION, SOLUTION INTRAVENOUS at 12:15

## 2023-12-23 RX ADMIN — CALCITRIOL 0.25 MCG: 0.25 CAPSULE, LIQUID FILLED ORAL at 09:31

## 2023-12-23 RX ADMIN — SEVELAMER CARBONATE 800 MG: 800 TABLET, FILM COATED ORAL at 17:00

## 2023-12-23 RX ADMIN — FINASTERIDE 5 MG: 5 TABLET, FILM COATED ORAL at 09:29

## 2023-12-23 ASSESSMENT — PAIN SCALES - GENERAL
PAINLEVEL_OUTOF10: 0 - NO PAIN
PAINLEVEL_OUTOF10: 7
PAINLEVEL_OUTOF10: 3

## 2023-12-23 ASSESSMENT — COGNITIVE AND FUNCTIONAL STATUS - GENERAL
STANDING UP FROM CHAIR USING ARMS: A LOT
CLIMB 3 TO 5 STEPS WITH RAILING: TOTAL
WALKING IN HOSPITAL ROOM: TOTAL
MOVING TO AND FROM BED TO CHAIR: TOTAL
TURNING FROM BACK TO SIDE WHILE IN FLAT BAD: A LOT
MOVING FROM LYING ON BACK TO SITTING ON SIDE OF FLAT BED WITH BEDRAILS: A LOT
MOBILITY SCORE: 9

## 2023-12-23 NOTE — CONSULTS
"  Infectious Disease    Patient Name: Joshua Gotti  Date: 12/22/2023  YOB: 1933  Medical Record Number: [id number]        Chief Complaint   Patient presents with    Wound Check     '[facility] and his left leg is also messed up too.\"         History of Present Illness:  Patient 89-year-old male with concerns of bilateral lower extremity swelling redness.  Relatively poor historian history taken from discussion with staff and chart              Review of Systems: All other ROS reviewed and are negative other than as stated in HPI            Social History     Tobacco Use    Smoking status: Never    Smokeless tobacco: Never   Vaping Use    Vaping Use: Never used   Substance Use Topics    Alcohol use: Never    Drug use: Never         History reviewed. No pertinent past medical history.        History reviewed. No pertinent surgical history.        Current Facility-Administered Medications:     acetaminophen (Tylenol) tablet 650 mg, 650 mg, oral, q4h PRN, 650 mg at 12/22/23 0212 **OR** acetaminophen (Tylenol) oral liquid 650 mg, 650 mg, nasogastric tube, q4h PRN **OR** acetaminophen (Tylenol) suppository 650 mg, 650 mg, rectal, q4h PRN, Matthew Edge MD    calcitriol (Rocaltrol) capsule 0.25 mcg, 0.25 mcg, oral, Daily, Matthew Edge MD, 0.25 mcg at 12/22/23 1716    carvedilol (Coreg) tablet 25 mg, 25 mg, oral, BID, Matthew Edge MD    doxazosin (Cardura) tablet 4 mg, 4 mg, oral, Daily, Matthew Edge MD, 4 mg at 12/22/23 1306    ferrous sulfate (325 mg ferrous sulfate) tablet 1 tablet, 1 tablet, oral, BID with meals, Matthew Edge MD, 1 tablet at 12/22/23 1716    finasteride (Proscar) tablet 5 mg, 5 mg, oral, Daily, Matthew Edge MD, 5 mg at 12/22/23 1307    fluticasone (Flonase) nasal spray 1 spray, 1 spray, Each Nostril, Daily, Matthew Edge MD, 1 spray at 12/22/23 1307    heparin (porcine) injection 5,000 Units, 5,000 Units, subcutaneous, q8h, Matthew LOZANO" "MD Minesh, 5,000 Units at 12/22/23 1716    piperacillin-tazobactam-dextrose (Zosyn) IV 3.375 g, 3.375 g, intravenous, q12h, Stopped at 12/22/23 1356 **AND** sodium chloride 0.9% infusion, 10 mL/hr, intravenous, q12h, Matthew Edge MD, Last Rate: 10 mL/hr at 12/22/23 0955, 10 mL/hr at 12/22/23 0955    polyethylene glycol (Glycolax, Miralax) packet 17 g, 17 g, oral, Daily PRN, Matthew Edge MD    sevelamer carbonate (Renvela) tablet 800 mg, 800 mg, oral, TID with meals, Matthew Edge MD, 800 mg at 12/22/23 1716    sodium bicarbonate tablet 650 mg, 650 mg, oral, BID, Matthew Edge MD, 650 mg at 12/22/23 2000    torsemide (Demadex) tablet 40 mg, 40 mg, oral, Daily, Matthew Edge MD, 40 mg at 12/22/23 1306    vancomycin (Vancocin) placeholder, , miscellaneous, Daily PRN, Matthew Edge MD     No Known Allergies       No family history on file.      Physical Exam:    Blood pressure 115/57, pulse 69, temperature 37.1 °C (98.8 °F), resp. rate 16, height 1.778 m (5' 10\"), weight 62.1 kg (136 lb 14.5 oz), SpO2 96 %.  General: Patient appears ok at the present time. NAD  Skin: was dressed by wound care films reviewed  HEENT:  Neck is supple, No subconjunctival hemorrhages, no oral exudates  Heart: S1 S2  Lungs: clear bilaterally  Abdomen: soft, ND, NTTP,  Back :no CVA tenderness  Extrem: No edema, non tender  Neuro exam: CN II-XII intact  Psych: cooperative    Labs:  I have reviewed all lab results by electronic record, including most recent CBC, metabolic panel, and pertinent abnormalities were addressed from an infectious disease perspective.  Trends are being monitored over time.    Lab Results   Component Value Date    WBC 7.3 12/22/2023    HGB 7.3 (L) 12/22/2023    HCT 22.4 (L) 12/22/2023    MCV 90 12/22/2023     12/22/2023     Lab Results   Component Value Date    GLUCOSE 92 12/22/2023    CALCIUM 7.8 (L) 12/22/2023     12/22/2023    K 5.1 12/22/2023    CO2 18 (L) 12/22/2023     12/22/2023    BUN " 195 (HH) 12/22/2023    CREATININE 12.09 (H) 12/22/2023       Radiology:  I have reviewed imaging results per electronic record and most pertinent abnormalities are being addressed from an infectious disease standpoint.            ASSESSMENT:  Problem List Items Addressed This Visit          Infectious Diseases    Cellulitis of both lower extremities - Primary     Other Visit Diagnoses       ESRD (end stage renal disease) (CMS/[facility])        Elevated troponin        Normocytic anemia               Contact dermatitis likely irritant moisture and stasis dermatitis chronic leg swelling with partial-thickness skin loss ,  possibly a component of cellulitis and wounds are likely colonized with bacteria given the larger environment    PLAN:   Needs aggressive wound care, and mitigation strategies to avoid moisture which will likely be challenging given kidney disease and edema      Ok with antibiotics for now

## 2023-12-23 NOTE — CARE PLAN
Problem: Nutrition  Goal: Oral intake greater 75%  Outcome: Progressing  Goal: Consume prescribed supplement  Outcome: Progressing  Goal: Adequate PO fluid intake  Outcome: Progressing  Goal: Nutrition support is meeting 75% of nutrient needs  Outcome: Progressing  Goal: Lab values WNL  Outcome: Progressing  Goal: Electrolytes WNL  Outcome: Progressing  Goal: Promote healing  Outcome: Progressing  Goal: Maintain stable weight  Outcome: Progressing     Problem: Fall/Injury  Goal: Not fall by end of shift  Outcome: Progressing  Goal: Be free from injury by end of the shift  Outcome: Progressing  Goal: Verbalize understanding of personal risk factors for fall in the hospital  Outcome: Progressing  Goal: Verbalize understanding of risk factor reduction measures to prevent injury from fall in the home  Outcome: Progressing  Goal: Use assistive devices by end of the shift  Outcome: Progressing  Goal: Pace activities to prevent fatigue by end of the shift  Outcome: Progressing     Problem: Skin  Goal: Decreased wound size/increased tissue granulation at next dressing change  Outcome: Progressing  Goal: Participates in plan/prevention/treatment measures  Outcome: Progressing  Goal: Prevent/manage excess moisture  Outcome: Progressing  Goal: Prevent/minimize sheer/friction injuries  Outcome: Progressing  Goal: Promote/optimize nutrition  Outcome: Progressing  Goal: Promote skin healing  Outcome: Progressing     Problem: Pain - Adult  Goal: Verbalizes/displays adequate comfort level or baseline comfort level  Outcome: Progressing     Problem: Safety - Adult  Goal: Free from fall injury  Outcome: Progressing     Problem: Discharge Planning  Goal: Discharge to home or other facility with appropriate resources  Outcome: Progressing     Problem: Chronic Conditions and Co-morbidities  Goal: Patient's chronic conditions and co-morbidity symptoms are monitored and maintained or improved  Outcome: Progressing

## 2023-12-23 NOTE — PROGRESS NOTES
"Physical Therapy    Physical Therapy Treatment    Patient Name: Joshua Gotti  MRN: [id number]  Today's Date: 12/23/2023  Time Calculation  Start Time: 0820  Stop Time: 0841  Time Calculation (min): 21 min       Assessment/Plan   PT Assessment  Evaluation/Treatment Tolerance: Patient limited by fatigue  Medical Staff Made Aware: Yes  End of Session Communication: Bedside nurse  End of Session Patient Position: Bed, 4 rail up     PT Plan  Treatment/Interventions: Transfer training, Bed mobility  PT Plan: Skilled PT  PT Frequency: 3 times per week  General Visit Information:   PT  Visit  PT Received On: 12/23/23  General  Prior to Session Communication: Bedside nurse (pca present)  Patient Position Received: Bed, 3 rail up  General Comment:  (pt agreeable to sit edge of bed  encouraged to eat breakfast with initial assistance given tolerated ~ 15 min. sit edge of bed with occassional c/o \"my legs hurt\" RN informed and to medicate.)    Subjective   Precautions:  Precautions  Precautions Comment:  (fall ,medical)      Objective   Pain:  Pain Assessment  Pain Assessment:  (c/o legs pain but did not rate)    Treatments:  Bed Mobility  Bed Mobility:  (mod x 1 for rolling side of bed with verbal and tactile education)    Transfers  Transfer:  (supine to sit with max. x 1, sit to stand with max. x 1 and bed elevated tolerated 5  second stand but unable to advance feet to sidestep with flexed knee's and forward posture presentation, sudden sit onto bed)    Outcome Measures:  [facility] Basic Mobility  Turning from your back to your side while in a flat bed without using bedrails: A lot  Moving from lying on your back to sitting on the side of a flat bed without using bedrails: A lot  Moving to and from bed to chair (including a wheelchair): Total  Standing up from a chair using your arms (e.g. wheelchair or bedside chair): A lot  To walk in hospital room: Total  Climbing 3-5 steps with railing: Total  Basic Mobility - Total Score: " 9     EDUCATION:  Outpatient Education  Individual(s) Educated: Patient  Education Provided: Body Mechanics, Fall Risk, Posture    Encounter Problems       Encounter Problems (Active)       PT Problem       Pt will demonstrate min A with all bed mobility   (Progressing)       Start:  12/22/23    Expected End:  01/05/24            Pt will demonstrate sit to stand and bed/chair transfers with Mod A with WW.   (Progressing)       Start:  12/22/23    Expected End:  01/05/24            Pt will tolerate 15 reps x2 LE seated/supine therapeutic exercise for LE strengthening and endurance  (Progressing)       Start:  12/22/23    Expected End:  01/05/24            pt will tolerate sitting EOB >5 minutes with SBA (Progressing)       Start:  12/22/23    Expected End:  01/05/24               Pain - Adult

## 2023-12-23 NOTE — PROGRESS NOTES
History of Present Illness:  Patient 89-year-old male with concerns of bilateral lower extremity swelling redness.  Relatively poor historian history taken from discussion with staff and chart      Doing ok            Review of Systems: All other ROS reviewed and are negative other than as stated in HPI            Social History     Tobacco Use    Smoking status: Never    Smokeless tobacco: Never   Vaping Use    Vaping Use: Never used   Substance Use Topics    Alcohol use: Never    Drug use: Never         History reviewed. No pertinent past medical history.        History reviewed. No pertinent surgical history.        Current Facility-Administered Medications:     acetaminophen (Tylenol) tablet 650 mg, 650 mg, oral, q4h PRN, 650 mg at 12/23/23 0929 **OR** acetaminophen (Tylenol) oral liquid 650 mg, 650 mg, nasogastric tube, q4h PRN **OR** acetaminophen (Tylenol) suppository 650 mg, 650 mg, rectal, q4h PRN, Matthew Edge MD    calcitriol (Rocaltrol) capsule 0.25 mcg, 0.25 mcg, oral, Daily, Matthew Edge MD, 0.25 mcg at 12/23/23 0931    carvedilol (Coreg) tablet 25 mg, 25 mg, oral, BID, Matthew Edge MD, 25 mg at 12/23/23 0930    doxazosin (Cardura) tablet 4 mg, 4 mg, oral, Daily, Matthew Edge MD, 4 mg at 12/23/23 0929    ferrous sulfate (325 mg ferrous sulfate) tablet 1 tablet, 1 tablet, oral, BID with meals, Matthew Edge MD, 1 tablet at 12/23/23 0929    finasteride (Proscar) tablet 5 mg, 5 mg, oral, Daily, Matthew Edge MD, 5 mg at 12/23/23 0929    fluticasone (Flonase) nasal spray 1 spray, 1 spray, Each Nostril, Daily, Matthew Edge MD, 1 spray at 12/22/23 1307    heparin (porcine) injection 5,000 Units, 5,000 Units, subcutaneous, q8h, Matthew Edge MD, 5,000 Units at 12/23/23 0931    piperacillin-tazobactam-dextrose (Zosyn) IV 3.375 g, 3.375 g, intravenous, q12h, Last Rate: 12.5 mL/hr at 12/23/23 1215, 3.375 g at 12/23/23 1215 **AND** sodium chloride 0.9% infusion, 10 mL/hr, intravenous, q12h,  "Matthew Edge MD, Last Rate: 10 mL/hr at 12/23/23 1216, 10 mL/hr at 12/23/23 1216    polyethylene glycol (Glycolax, Miralax) packet 17 g, 17 g, oral, Daily PRN, Matthew Edge MD    sevelamer carbonate (Renvela) tablet 800 mg, 800 mg, oral, TID with meals, Matthew Edge MD, 800 mg at 12/23/23 1215    sodium bicarbonate tablet 650 mg, 650 mg, oral, BID, Matthew Edge MD, 650 mg at 12/23/23 0930    torsemide (Demadex) tablet 40 mg, 40 mg, oral, Daily, Matthew Edge MD, 40 mg at 12/23/23 0930    traMADol (Ultram) tablet 50 mg, 50 mg, oral, q12h PRN, Matthew Edge MD, 50 mg at 12/23/23 1215    vancomycin (Vancocin) placeholder, , miscellaneous, Daily PRN, Matthew Edge MD     No Known Allergies       No family history on file.      Physical Exam:    Blood pressure 106/61, pulse 66, temperature 36.2 °C (97.2 °F), resp. rate 18, height 1.778 m (5' 10\"), weight 62.1 kg (136 lb 14.5 oz), SpO2 96 %.  General: Patient appears ok at the present time. NAD  Skin: was dressed by wound care films reviewed  HEENT:  Neck is supple, No subconjunctival hemorrhages, no oral exudates  Heart: S1 S2  Lungs: clear bilaterally  Abdomen: soft, ND, NTTP,  Back :no CVA tenderness  Extrem: No edema, non tender  Neuro exam: CN II-XII intact  Psych: cooperative    Labs:  I have reviewed all lab results by electronic record, including most recent CBC, metabolic panel, and pertinent abnormalities were addressed from an infectious disease perspective.  Trends are being monitored over time.    Lab Results   Component Value Date    WBC 6.2 12/23/2023    HGB 7.2 (L) 12/23/2023    HCT 22.3 (L) 12/23/2023    MCV 90 12/23/2023     12/23/2023     Lab Results   Component Value Date    GLUCOSE 91 12/23/2023    CALCIUM 7.6 (L) 12/23/2023     12/23/2023    K 5.2 12/23/2023    CO2 18 (L) 12/23/2023     12/23/2023     (HH) 12/23/2023    CREATININE 12.19 (H) 12/23/2023       Radiology:  I have reviewed imaging results per " electronic record and most pertinent abnormalities are being addressed from an infectious disease standpoint.            ASSESSMENT:  Problem List Items Addressed This Visit          Infectious Diseases    Cellulitis of both lower extremities - Primary     Other Visit Diagnoses       ESRD (end stage renal disease) (CMS/Formerly [facility])        Elevated troponin        Normocytic anemia               Contact dermatitis likely irritant moisture and stasis dermatitis chronic leg swelling with partial-thickness skin loss ,  possibly a component of cellulitis and wounds are likely colonized with bacteria given the larger environment.Also consider poor healing soft issues with kidney function, calciphylaxis    PLAN:   Needs aggressive wound care, and mitigation strategies to avoid moisture which will likely be challenging given kidney disease and edema      Ok with antibiotics for now, if being discharged and cultures negative would try keflex

## 2023-12-23 NOTE — PROGRESS NOTES
Medical Group Progress Note  ASSESSMENT & PLAN:     Bilateral lower extremity cellulitis  - Presented from home with worsening lower extremity erythema, swelling, drainage, pain over the past few months, ESR and CRP significantly elevated  - Will continue vancomycin and Zosyn at this time  - ID following  - Wound care following  - Wound cultures collected today 12/22     CKD stage V  - Renal function at baseline  - Patient is declining hemodialysis at this time     Essential hypertension  BPH  Hyperlipidemia  Diastolic CHF  - Resume home medications once reconciled     Anemia of chronic disease  - Hemoglobin 7.3 (baseline not available [facility])     Elevated troponin  - Troponin 171, 158 likely in setting of CKD stage V  - No chest pain, low concerns for ACS with no EKG changes as well     VTE prophylaxis: Heparin subcutaneous    Disposition/Daily update: Patient seen by ID and chronic cellulitis may likely not be infectious.  Continue antibiotic at this time however.  Awaiting pre-CERT for discharge.      ---Of note, this documentation is completed using the Dragon Dictation system (voice recognition software). There may be spelling and/or grammatical errors that were not corrected prior to final submission.---      Matthew Edge MD    SUBJECTIVE     Patient was seen and examined bedside this morning.  Had no acute events overnight.  Does state having some pain in his lower extremities otherwise no complaints for me.    OBJECTIVE:     Last Recorded Vitals:  Vitals:    12/22/23 1959 12/22/23 2024 12/23/23 0306 12/23/23 0747   BP: 104/82 115/57 118/56 125/56   BP Location:  Left arm     Patient Position:  Lying     Pulse: 71 69 73 61   Resp:  16  16   Temp:  37.1 °C (98.8 °F) 36.7 °C (98.1 °F) 36.7 °C (98.1 °F)   TempSrc:       SpO2:  96% 98% 97%   Weight:       Height:         Last I/O:  I/O last 3 completed shifts:  In: 100 (1.6 mL/kg) [IV Piggyback:100]  Out: 850 (13.7 mL/kg) [Urine:850 (0.4  mL/kg/hr)]  Weight: 62.1 kg     Physical Exam  Vitals reviewed.   Constitutional:       General: He is not in acute distress.     Appearance: Normal appearance. He is normal weight. He is not ill-appearing.   HENT:      Head: Normocephalic and atraumatic.   Eyes:      Extraocular Movements: Extraocular movements intact.      Conjunctiva/sclera: Conjunctivae normal.   Cardiovascular:      Rate and Rhythm: Normal rate and regular rhythm.      Pulses: Normal pulses.      Heart sounds: Normal heart sounds.   Pulmonary:      Effort: Pulmonary effort is normal.      Breath sounds: Normal breath sounds.   Abdominal:      General: Abdomen is flat. Bowel sounds are normal. There is no distension.      Palpations: Abdomen is soft.      Tenderness: There is no abdominal tenderness. There is no guarding.   Musculoskeletal:         General: Normal range of motion.      Cervical back: Normal range of motion and neck supple.      Right lower leg: Edema present.      Left lower leg: Edema present.   Skin:     General: Skin is warm.      Findings: Erythema and rash present.      Comments: See media section or history and physical for lower extremity wounds, unchanged today   Neurological:      General: No focal deficit present.      Mental Status: He is alert and oriented to person, place, and time. Mental status is at baseline.     Inpatient Medications:  calcitriol, 0.25 mcg, oral, Daily  carvedilol, 25 mg, oral, BID  doxazosin, 4 mg, oral, Daily  ferrous sulfate (325 mg ferrous sulfate), 1 tablet, oral, BID with meals  finasteride, 5 mg, oral, Daily  fluticasone, 1 spray, Each Nostril, Daily  heparin (porcine), 5,000 Units, subcutaneous, q8h  piperacillin-tazobactam-dextrose, 3.375 g, intravenous, q12h   And  sodium chloride 0.9%, 10 mL/hr, intravenous, q12h  sevelamer carbonate, 800 mg, oral, TID with meals  sodium bicarbonate, 650 mg, oral, BID  torsemide, 40 mg, oral, Daily    PRN Medications  PRN medications: acetaminophen  **OR** acetaminophen **OR** acetaminophen, polyethylene glycol, traMADol, vancomycin  Continuous Medications:     LABS AND IMAGING:     Labs:  Results from last 7 days   Lab Units 12/23/23  0551 12/22/23  0753 12/21/23  1535   WBC AUTO x10*3/uL 6.2 7.3 11.2   RBC AUTO x10*6/uL 2.47* 2.49* 3.01*   HEMOGLOBIN g/dL 7.2* 7.3* 8.9*   HEMATOCRIT % 22.3* 22.4* 27.7*   MCV fL 90 90 92   MCH pg 29.1 29.3 29.6   MCHC g/dL 32.3 32.6 32.1   RDW % 14.1 14.3 14.2   PLATELETS AUTO x10*3/uL 179 178 220       Results from last 7 days   Lab Units 12/23/23  0551 12/22/23  0753 12/21/23  1535   SODIUM mmol/L 137 137 138   POTASSIUM mmol/L 5.2 5.1 5.3   CHLORIDE mmol/L 100 101 97*   CO2 mmol/L 18* 18* 18*   BUN mg/dL 184* 195* 201*   CREATININE mg/dL 12.19* 12.09* 11.86*   GLUCOSE mg/dL 91 92 160*   PROTEIN TOTAL g/dL  --   --  7.7   CALCIUM mg/dL 7.6* 7.8* 8.3*   BILIRUBIN TOTAL mg/dL  --   --  0.4   ALK PHOS U/L  --   --  77   AST U/L  --   --  8*   ALT U/L  --   --  6*           Results from last 7 days   Lab Units 12/21/23  1618 12/21/23  1535   TROPHS ng/L 158* 171*       Imaging:  ECG 12 lead  Sinus rhythm with 1st degree AV block  Incomplete left bundle branch block  ST & T wave abnormality, consider lateral ischemia  Abnormal ECG  No previous ECGs available    See ED provider note for full interpretation and clinical correlation  XR chest 1 view  Narrative: Interpreted By:  Noris Erickson,   STUDY:  XR CHEST 1 VIEW;  12/21/2023 4:10 pm      INDICATION:  Signs/Symptoms:peripheral edema.      COMPARISON:  None.      ACCESSION NUMBER(S):  [id number]      ORDERING CLINICIAN:  ERIC GREGG      FINDINGS:  Soft tissue fold overlies and obscures the central upper chest.  Patient is slightly rotated. Reverse lordotic projection obtained. No  definite focal infiltrate, pleural effusion or pneumothorax as  visualized. The cardiac silhouette is mildly enlarged. Aortic  tortuosity.      Impression: Mildly enlarged cardiac silhouette. No  definite focal infiltrate.      MACRO:  None.      Signed by: Noris Erickson 12/21/2023 4:32 PM  Dictation workstation:   [id number]

## 2023-12-24 LAB
ANION GAP SERPL CALC-SCNC: 24 MMOL/L (ref 10–20)
BASOPHILS # BLD AUTO: 0.01 X10*3/UL (ref 0–0.1)
BASOPHILS NFR BLD AUTO: 0.1 %
BUN SERPL-MCNC: 202 MG/DL (ref 6–23)
CALCIUM SERPL-MCNC: 7.9 MG/DL (ref 8.6–10.3)
CHLORIDE SERPL-SCNC: 99 MMOL/L (ref 98–107)
CO2 SERPL-SCNC: 20 MMOL/L (ref 21–32)
CREAT SERPL-MCNC: 11.97 MG/DL (ref 0.5–1.3)
EOSINOPHIL # BLD AUTO: 0.08 X10*3/UL (ref 0–0.4)
EOSINOPHIL NFR BLD AUTO: 1.2 %
ERYTHROCYTE [DISTWIDTH] IN BLOOD BY AUTOMATED COUNT: 14.1 % (ref 11.5–14.5)
GFR SERPL CREATININE-BSD FRML MDRD: 4 ML/MIN/1.73M*2
GLUCOSE SERPL-MCNC: 94 MG/DL (ref 74–99)
HCT VFR BLD AUTO: 23.6 % (ref 41–52)
HGB BLD-MCNC: 7.7 G/DL (ref 13.5–17.5)
HOLD SPECIMEN: NORMAL
IMM GRANULOCYTES # BLD AUTO: 0.07 X10*3/UL (ref 0–0.5)
IMM GRANULOCYTES NFR BLD AUTO: 1 % (ref 0–0.9)
LYMPHOCYTES # BLD AUTO: 0.42 X10*3/UL (ref 0.8–3)
LYMPHOCYTES NFR BLD AUTO: 6.1 %
MCH RBC QN AUTO: 29.1 PG (ref 26–34)
MCHC RBC AUTO-ENTMCNC: 32.6 G/DL (ref 32–36)
MCV RBC AUTO: 89 FL (ref 80–100)
MONOCYTES # BLD AUTO: 0.52 X10*3/UL (ref 0.05–0.8)
MONOCYTES NFR BLD AUTO: 7.6 %
NEUTROPHILS # BLD AUTO: 5.73 X10*3/UL (ref 1.6–5.5)
NEUTROPHILS NFR BLD AUTO: 84 %
NRBC BLD-RTO: 0 /100 WBCS (ref 0–0)
PLATELET # BLD AUTO: 165 X10*3/UL (ref 150–450)
POTASSIUM SERPL-SCNC: 5.5 MMOL/L (ref 3.5–5.3)
RBC # BLD AUTO: 2.65 X10*6/UL (ref 4.5–5.9)
SODIUM SERPL-SCNC: 137 MMOL/L (ref 136–145)
VANCOMYCIN SERPL-MCNC: 19.5 UG/ML (ref 5–20)
WBC # BLD AUTO: 6.8 X10*3/UL (ref 4.4–11.3)

## 2023-12-24 PROCEDURE — 36415 COLL VENOUS BLD VENIPUNCTURE: CPT | Performed by: STUDENT IN AN ORGANIZED HEALTH CARE EDUCATION/TRAINING PROGRAM

## 2023-12-24 PROCEDURE — 96372 THER/PROPH/DIAG INJ SC/IM: CPT | Performed by: STUDENT IN AN ORGANIZED HEALTH CARE EDUCATION/TRAINING PROGRAM

## 2023-12-24 PROCEDURE — 2500000004 HC RX 250 GENERAL PHARMACY W/ HCPCS (ALT 636 FOR OP/ED): Performed by: STUDENT IN AN ORGANIZED HEALTH CARE EDUCATION/TRAINING PROGRAM

## 2023-12-24 PROCEDURE — 80048 BASIC METABOLIC PNL TOTAL CA: CPT | Performed by: STUDENT IN AN ORGANIZED HEALTH CARE EDUCATION/TRAINING PROGRAM

## 2023-12-24 PROCEDURE — 1200000002 HC GENERAL ROOM WITH TELEMETRY DAILY

## 2023-12-24 PROCEDURE — 85025 COMPLETE CBC W/AUTO DIFF WBC: CPT | Performed by: STUDENT IN AN ORGANIZED HEALTH CARE EDUCATION/TRAINING PROGRAM

## 2023-12-24 PROCEDURE — 2500000001 HC RX 250 WO HCPCS SELF ADMINISTERED DRUGS (ALT 637 FOR MEDICARE OP): Performed by: STUDENT IN AN ORGANIZED HEALTH CARE EDUCATION/TRAINING PROGRAM

## 2023-12-24 PROCEDURE — 36415 COLL VENOUS BLD VENIPUNCTURE: CPT

## 2023-12-24 PROCEDURE — 99232 SBSQ HOSP IP/OBS MODERATE 35: CPT | Performed by: STUDENT IN AN ORGANIZED HEALTH CARE EDUCATION/TRAINING PROGRAM

## 2023-12-24 PROCEDURE — 80202 ASSAY OF VANCOMYCIN: CPT

## 2023-12-24 RX ADMIN — HEPARIN SODIUM 5000 UNITS: 5000 INJECTION INTRAVENOUS; SUBCUTANEOUS at 18:58

## 2023-12-24 RX ADMIN — FERROUS SULFATE TAB 325 MG (65 MG ELEMENTAL FE) 1 TABLET: 325 (65 FE) TAB at 11:25

## 2023-12-24 RX ADMIN — HEPARIN SODIUM 5000 UNITS: 5000 INJECTION INTRAVENOUS; SUBCUTANEOUS at 10:37

## 2023-12-24 RX ADMIN — PIPERACILLIN SODIUM AND TAZOBACTAM SODIUM 3.38 G: 3; .375 INJECTION, SOLUTION INTRAVENOUS at 22:50

## 2023-12-24 RX ADMIN — HEPARIN SODIUM 5000 UNITS: 5000 INJECTION INTRAVENOUS; SUBCUTANEOUS at 01:59

## 2023-12-24 RX ADMIN — DOXAZOSIN 4 MG: 4 TABLET ORAL at 10:35

## 2023-12-24 RX ADMIN — FINASTERIDE 5 MG: 5 TABLET, FILM COATED ORAL at 10:35

## 2023-12-24 RX ADMIN — CARVEDILOL 25 MG: 12.5 TABLET, FILM COATED ORAL at 22:49

## 2023-12-24 RX ADMIN — FLUTICASONE PROPIONATE 1 SPRAY: 50 SPRAY, METERED NASAL at 10:33

## 2023-12-24 RX ADMIN — SODIUM BICARBONATE 650 MG: 650 TABLET ORAL at 10:35

## 2023-12-24 RX ADMIN — SODIUM CHLORIDE 10 ML/HR: 9 INJECTION, SOLUTION INTRAVENOUS at 22:59

## 2023-12-24 RX ADMIN — TORSEMIDE 40 MG: 20 TABLET ORAL at 10:34

## 2023-12-24 RX ADMIN — SODIUM BICARBONATE 650 MG: 650 TABLET ORAL at 22:49

## 2023-12-24 RX ADMIN — CALCITRIOL 0.25 MCG: 0.25 CAPSULE, LIQUID FILLED ORAL at 10:34

## 2023-12-24 RX ADMIN — SEVELAMER CARBONATE 800 MG: 800 TABLET, FILM COATED ORAL at 10:37

## 2023-12-24 RX ADMIN — TRAMADOL HYDROCHLORIDE 50 MG: 50 TABLET, COATED ORAL at 02:06

## 2023-12-24 RX ADMIN — SODIUM CHLORIDE 10 ML/HR: 9 INJECTION, SOLUTION INTRAVENOUS at 11:30

## 2023-12-24 RX ADMIN — FERROUS SULFATE TAB 325 MG (65 MG ELEMENTAL FE) 1 TABLET: 325 (65 FE) TAB at 18:58

## 2023-12-24 RX ADMIN — PIPERACILLIN SODIUM AND TAZOBACTAM SODIUM 3.38 G: 3; .375 INJECTION, SOLUTION INTRAVENOUS at 11:30

## 2023-12-24 RX ADMIN — SEVELAMER CARBONATE 800 MG: 800 TABLET, FILM COATED ORAL at 18:58

## 2023-12-24 ASSESSMENT — COGNITIVE AND FUNCTIONAL STATUS - GENERAL
TURNING FROM BACK TO SIDE WHILE IN FLAT BAD: A LOT
MOVING FROM LYING ON BACK TO SITTING ON SIDE OF FLAT BED WITH BEDRAILS: A LOT
DAILY ACTIVITIY SCORE: 13
EATING MEALS: A LITTLE
MOVING TO AND FROM BED TO CHAIR: A LOT
MOBILITY SCORE: 10
MOBILITY SCORE: 10
DRESSING REGULAR LOWER BODY CLOTHING: TOTAL
PERSONAL GROOMING: A LITTLE
CLIMB 3 TO 5 STEPS WITH RAILING: TOTAL
WALKING IN HOSPITAL ROOM: TOTAL
HELP NEEDED FOR BATHING: TOTAL
PERSONAL GROOMING: A LITTLE
DRESSING REGULAR LOWER BODY CLOTHING: TOTAL
DRESSING REGULAR UPPER BODY CLOTHING: A LITTLE
DRESSING REGULAR UPPER BODY CLOTHING: A LITTLE
MOVING TO AND FROM BED TO CHAIR: A LOT
MOVING FROM LYING ON BACK TO SITTING ON SIDE OF FLAT BED WITH BEDRAILS: A LOT
EATING MEALS: A LITTLE
WALKING IN HOSPITAL ROOM: TOTAL
DAILY ACTIVITIY SCORE: 13
HELP NEEDED FOR BATHING: TOTAL
TOILETING: A LOT
STANDING UP FROM CHAIR USING ARMS: A LOT
STANDING UP FROM CHAIR USING ARMS: A LOT
TOILETING: A LOT
TURNING FROM BACK TO SIDE WHILE IN FLAT BAD: A LOT
CLIMB 3 TO 5 STEPS WITH RAILING: TOTAL

## 2023-12-24 ASSESSMENT — PAIN - FUNCTIONAL ASSESSMENT
PAIN_FUNCTIONAL_ASSESSMENT: 0-10
PAIN_FUNCTIONAL_ASSESSMENT: 0-10

## 2023-12-24 ASSESSMENT — PAIN SCALES - GENERAL: PAINLEVEL_OUTOF10: 0 - NO PAIN

## 2023-12-24 ASSESSMENT — PAIN DESCRIPTION - LOCATION: LOCATION: LEG

## 2023-12-24 ASSESSMENT — PAIN DESCRIPTION - ORIENTATION: ORIENTATION: RIGHT;LEFT

## 2023-12-24 NOTE — PROGRESS NOTES
Medical Group Progress Note  ASSESSMENT & PLAN:     Bilateral lower extremity cellulitis  - Will continue vancomycin and Zosyn at this time  - ID following  - Wound care following - twice daily dressing changes with Vashe  - Wound cultures collected 12/22, no growth currently  - See updated image below or in media section from today 12/24 - slight overall improvement     CKD stage V  - Renal function at baseline  - Patient is declining hemodialysis at this time (and was as an outpatient) - as a result nephrology not consulted    Bilateral buttock pressure injury - stage II - present on admission  - See media section for images from wound care evaluation  - Present on admission  - Local wound care     Essential hypertension  BPH  Hyperlipidemia  Diastolic CHF  - Resume home medications once reconciled     Anemia of chronic disease  - Hemoglobin 7.6 (baseline not available [facility])     Elevated troponin  - Troponin 171, 158 likely in setting of CKD stage V  - No chest pain, low concerns for ACS with no EKG changes as well     VTE prophylaxis: Heparin subcutaneous    Disposition/Daily update: Wound cultures with no growth at this time, however wound seems to have improved on exam from admission on 12/22.  Awaiting pre-CERT for discharge to SNF.  Renal function at baseline, no need for nephrology consult as patient is declining hemodialysis as an outpatient as well as confirmed this admission.      ---Of note, this documentation is completed using the Dragon Dictation system (voice recognition software). There may be spelling and/or grammatical errors that were not corrected prior to final submission.---      Matthew Edge MD    SUBJECTIVE     Patient was seen and examined at bedside this morning.  Continues to have episodes of pain in his lower legs with dressing changes.  Otherwise had no complaints.    OBJECTIVE:     Last Recorded Vitals:  Vitals:    12/23/23 0306 12/23/23 0747 12/23/23 1500  12/24/23 0545   BP: 118/56 125/56 106/61 119/62   Pulse: 73 61 66 59   Resp:  16 18    Temp: 36.7 °C (98.1 °F) 36.7 °C (98.1 °F) 36.2 °C (97.2 °F) 35.9 °C (96.6 °F)   TempSrc:       SpO2: 98% 97% 96% 98%   Weight:       Height:         Last I/O:  I/O last 3 completed shifts:  In: 110 (1.8 mL/kg) [I.V.:10 (0.2 mL/kg); IV Piggyback:100]  Out: 750 (12.1 mL/kg) [Urine:750 (0.3 mL/kg/hr)]  Weight: 62.1 kg     Physical Exam  Vitals reviewed.   Constitutional:       General: He is not in acute distress.     Appearance: Normal appearance. He is normal weight. He is not ill-appearing.   HENT:      Head: Normocephalic and atraumatic.   Eyes:      Extraocular Movements: Extraocular movements intact.      Conjunctiva/sclera: Conjunctivae normal.   Cardiovascular:      Rate and Rhythm: Normal rate and regular rhythm.      Pulses: Normal pulses.      Heart sounds: Normal heart sounds.   Pulmonary:      Effort: Pulmonary effort is normal.      Breath sounds: Normal breath sounds.   Abdominal:      General: Abdomen is flat. Bowel sounds are normal. There is no distension.      Palpations: Abdomen is soft.      Tenderness: There is no abdominal tenderness. There is no guarding.   Musculoskeletal:         General: Normal range of motion.      Cervical back: Normal range of motion and neck supple.      Right lower leg: Edema present.      Left lower leg: Edema present.   Skin:     General: Skin is warm.      Findings: Erythema and rash present.      Comments: See below for updated imaging of bilateral lower extremity wounds from today's exam 12/24.   Neurological:      General: No focal deficit present.      Mental Status: He is alert and oriented to person, place, and time. Mental status is at baseline.       Inpatient Medications:  calcitriol, 0.25 mcg, oral, Daily  carvedilol, 25 mg, oral, BID  doxazosin, 4 mg, oral, Daily  ferrous sulfate (325 mg ferrous sulfate), 1 tablet, oral, BID with meals  finasteride, 5 mg, oral,  Daily  fluticasone, 1 spray, Each Nostril, Daily  heparin (porcine), 5,000 Units, subcutaneous, q8h  piperacillin-tazobactam-dextrose, 3.375 g, intravenous, q12h   And  sodium chloride 0.9%, 10 mL/hr, intravenous, q12h  sevelamer carbonate, 800 mg, oral, TID with meals  sodium bicarbonate, 650 mg, oral, BID  torsemide, 40 mg, oral, Daily    PRN Medications  PRN medications: acetaminophen **OR** acetaminophen **OR** acetaminophen, polyethylene glycol, traMADol, vancomycin  Continuous Medications:     LABS AND IMAGING:     Labs:  Results from last 7 days   Lab Units 12/24/23  0524 12/23/23  0551 12/22/23  0753   WBC AUTO x10*3/uL 6.8 6.2 7.3   RBC AUTO x10*6/uL 2.65* 2.47* 2.49*   HEMOGLOBIN g/dL 7.7* 7.2* 7.3*   HEMATOCRIT % 23.6* 22.3* 22.4*   MCV fL 89 90 90   MCH pg 29.1 29.1 29.3   MCHC g/dL 32.6 32.3 32.6   RDW % 14.1 14.1 14.3   PLATELETS AUTO x10*3/uL 165 179 178       Results from last 7 days   Lab Units 12/24/23  0524 12/23/23  0551 12/22/23  0753 12/21/23  1535   SODIUM mmol/L 137 137 137 138   POTASSIUM mmol/L 5.5* 5.2 5.1 5.3   CHLORIDE mmol/L 99 100 101 97*   CO2 mmol/L 20* 18* 18* 18*   BUN mg/dL 202* 184* 195* 201*   CREATININE mg/dL 11.97* 12.19* 12.09* 11.86*   GLUCOSE mg/dL 94 91 92 160*   PROTEIN TOTAL g/dL  --   --   --  7.7   CALCIUM mg/dL 7.9* 7.6* 7.8* 8.3*   BILIRUBIN TOTAL mg/dL  --   --   --  0.4   ALK PHOS U/L  --   --   --  77   AST U/L  --   --   --  8*   ALT U/L  --   --   --  6*           Results from last 7 days   Lab Units 12/21/23  1618 12/21/23  1535   TROPHS ng/L 158* 171*       Imaging:  ECG 12 lead  Sinus rhythm with 1st degree AV block  Incomplete left bundle branch block  ST & T wave abnormality, consider lateral ischemia  Abnormal ECG  No previous ECGs available    See ED provider note for full interpretation and clinical correlation  XR chest 1 view  Narrative: Interpreted By:  Noris Erickson,   STUDY:  XR CHEST 1 VIEW;  12/21/2023 4:10 pm       INDICATION:  Signs/Symptoms:peripheral edema.      COMPARISON:  None.      ACCESSION NUMBER(S):  [id number]      ORDERING CLINICIAN:  ERIC GREGG      FINDINGS:  Soft tissue fold overlies and obscures the central upper chest.  Patient is slightly rotated. Reverse lordotic projection obtained. No  definite focal infiltrate, pleural effusion or pneumothorax as  visualized. The cardiac silhouette is mildly enlarged. Aortic  tortuosity.      Impression: Mildly enlarged cardiac silhouette. No definite focal infiltrate.      MACRO:  None.      Signed by: Noris Erickson 12/21/2023 4:32 PM  Dictation workstation:   [id number]

## 2023-12-25 LAB
ANION GAP SERPL CALC-SCNC: 25 MMOL/L (ref 10–20)
ATRIAL RATE: 79 BPM
BACTERIA BLD CULT: NORMAL
BACTERIA BLD CULT: NORMAL
BACTERIA SPEC CULT: ABNORMAL
BACTERIA SPEC CULT: ABNORMAL
BASOPHILS # BLD AUTO: 0.01 X10*3/UL (ref 0–0.1)
BASOPHILS NFR BLD AUTO: 0.1 %
BUN SERPL-MCNC: 200 MG/DL (ref 6–23)
CALCIUM SERPL-MCNC: 7.9 MG/DL (ref 8.6–10.3)
CHLORIDE SERPL-SCNC: 98 MMOL/L (ref 98–107)
CO2 SERPL-SCNC: 21 MMOL/L (ref 21–32)
CREAT SERPL-MCNC: 12.47 MG/DL (ref 0.5–1.3)
EOSINOPHIL # BLD AUTO: 0.05 X10*3/UL (ref 0–0.4)
EOSINOPHIL NFR BLD AUTO: 0.5 %
ERYTHROCYTE [DISTWIDTH] IN BLOOD BY AUTOMATED COUNT: 14.3 % (ref 11.5–14.5)
GFR SERPL CREATININE-BSD FRML MDRD: 3 ML/MIN/1.73M*2
GLUCOSE SERPL-MCNC: 88 MG/DL (ref 74–99)
GRAM STN SPEC: ABNORMAL
GRAM STN SPEC: ABNORMAL
HCT VFR BLD AUTO: 26.6 % (ref 41–52)
HGB BLD-MCNC: 8.8 G/DL (ref 13.5–17.5)
HOLD SPECIMEN: NORMAL
IMM GRANULOCYTES # BLD AUTO: 0.1 X10*3/UL (ref 0–0.5)
IMM GRANULOCYTES NFR BLD AUTO: 1 % (ref 0–0.9)
LYMPHOCYTES # BLD AUTO: 0.28 X10*3/UL (ref 0.8–3)
LYMPHOCYTES NFR BLD AUTO: 2.8 %
MCH RBC QN AUTO: 29.3 PG (ref 26–34)
MCHC RBC AUTO-ENTMCNC: 33.1 G/DL (ref 32–36)
MCV RBC AUTO: 89 FL (ref 80–100)
MONOCYTES # BLD AUTO: 0.51 X10*3/UL (ref 0.05–0.8)
MONOCYTES NFR BLD AUTO: 5.2 %
NEUTROPHILS # BLD AUTO: 8.92 X10*3/UL (ref 1.6–5.5)
NEUTROPHILS NFR BLD AUTO: 90.4 %
NRBC BLD-RTO: 0 /100 WBCS (ref 0–0)
P AXIS: 72 DEGREES
P OFFSET: 134 MS
P ONSET: 95 MS
PLATELET # BLD AUTO: 193 X10*3/UL (ref 150–450)
POTASSIUM SERPL-SCNC: 5.6 MMOL/L (ref 3.5–5.3)
PR INTERVAL: 224 MS
Q ONSET: 207 MS
QRS COUNT: 13 BEATS
QRS DURATION: 120 MS
QT INTERVAL: 398 MS
QTC CALCULATION(BAZETT): 456 MS
QTC FREDERICIA: 436 MS
R AXIS: -14 DEGREES
RBC # BLD AUTO: 3 X10*6/UL (ref 4.5–5.9)
SODIUM SERPL-SCNC: 138 MMOL/L (ref 136–145)
T AXIS: 102 DEGREES
T OFFSET: 406 MS
VENTRICULAR RATE: 79 BPM
WBC # BLD AUTO: 9.9 X10*3/UL (ref 4.4–11.3)

## 2023-12-25 PROCEDURE — 36415 COLL VENOUS BLD VENIPUNCTURE: CPT | Performed by: STUDENT IN AN ORGANIZED HEALTH CARE EDUCATION/TRAINING PROGRAM

## 2023-12-25 PROCEDURE — 96372 THER/PROPH/DIAG INJ SC/IM: CPT | Performed by: STUDENT IN AN ORGANIZED HEALTH CARE EDUCATION/TRAINING PROGRAM

## 2023-12-25 PROCEDURE — 97535 SELF CARE MNGMENT TRAINING: CPT | Mod: GO,CO

## 2023-12-25 PROCEDURE — 99232 SBSQ HOSP IP/OBS MODERATE 35: CPT | Performed by: STUDENT IN AN ORGANIZED HEALTH CARE EDUCATION/TRAINING PROGRAM

## 2023-12-25 PROCEDURE — 80048 BASIC METABOLIC PNL TOTAL CA: CPT | Performed by: STUDENT IN AN ORGANIZED HEALTH CARE EDUCATION/TRAINING PROGRAM

## 2023-12-25 PROCEDURE — 1200000002 HC GENERAL ROOM WITH TELEMETRY DAILY

## 2023-12-25 PROCEDURE — 85025 COMPLETE CBC W/AUTO DIFF WBC: CPT | Performed by: STUDENT IN AN ORGANIZED HEALTH CARE EDUCATION/TRAINING PROGRAM

## 2023-12-25 PROCEDURE — 2500000001 HC RX 250 WO HCPCS SELF ADMINISTERED DRUGS (ALT 637 FOR MEDICARE OP): Performed by: STUDENT IN AN ORGANIZED HEALTH CARE EDUCATION/TRAINING PROGRAM

## 2023-12-25 PROCEDURE — 97530 THERAPEUTIC ACTIVITIES: CPT | Mod: GP,CQ | Performed by: PHYSICAL THERAPY ASSISTANT

## 2023-12-25 PROCEDURE — 2500000004 HC RX 250 GENERAL PHARMACY W/ HCPCS (ALT 636 FOR OP/ED): Performed by: STUDENT IN AN ORGANIZED HEALTH CARE EDUCATION/TRAINING PROGRAM

## 2023-12-25 RX ADMIN — TORSEMIDE 40 MG: 20 TABLET ORAL at 11:02

## 2023-12-25 RX ADMIN — CALCITRIOL 0.25 MCG: 0.25 CAPSULE, LIQUID FILLED ORAL at 11:04

## 2023-12-25 RX ADMIN — SODIUM CHLORIDE 10 ML/HR: 9 INJECTION, SOLUTION INTRAVENOUS at 12:18

## 2023-12-25 RX ADMIN — FERROUS SULFATE TAB 325 MG (65 MG ELEMENTAL FE) 1 TABLET: 325 (65 FE) TAB at 11:03

## 2023-12-25 RX ADMIN — SODIUM ZIRCONIUM CYCLOSILICATE 5 G: 5 POWDER, FOR SUSPENSION ORAL at 11:10

## 2023-12-25 RX ADMIN — SODIUM CHLORIDE 10 ML/HR: 9 INJECTION, SOLUTION INTRAVENOUS at 21:04

## 2023-12-25 RX ADMIN — DOXAZOSIN 4 MG: 4 TABLET ORAL at 11:03

## 2023-12-25 RX ADMIN — FERROUS SULFATE TAB 325 MG (65 MG ELEMENTAL FE) 1 TABLET: 325 (65 FE) TAB at 16:11

## 2023-12-25 RX ADMIN — SODIUM BICARBONATE 650 MG: 650 TABLET ORAL at 11:02

## 2023-12-25 RX ADMIN — SEVELAMER CARBONATE 800 MG: 800 TABLET, FILM COATED ORAL at 16:11

## 2023-12-25 RX ADMIN — HEPARIN SODIUM 5000 UNITS: 5000 INJECTION INTRAVENOUS; SUBCUTANEOUS at 11:04

## 2023-12-25 RX ADMIN — POLYETHYLENE GLYCOL 3350 17 G: 17 POWDER, FOR SOLUTION ORAL at 11:02

## 2023-12-25 RX ADMIN — PIPERACILLIN SODIUM AND TAZOBACTAM SODIUM 3.38 G: 3; .375 INJECTION, SOLUTION INTRAVENOUS at 21:04

## 2023-12-25 RX ADMIN — HEPARIN SODIUM 5000 UNITS: 5000 INJECTION INTRAVENOUS; SUBCUTANEOUS at 16:10

## 2023-12-25 RX ADMIN — FINASTERIDE 5 MG: 5 TABLET, FILM COATED ORAL at 11:04

## 2023-12-25 RX ADMIN — CARVEDILOL 25 MG: 12.5 TABLET, FILM COATED ORAL at 11:03

## 2023-12-25 RX ADMIN — HEPARIN SODIUM 5000 UNITS: 5000 INJECTION INTRAVENOUS; SUBCUTANEOUS at 02:44

## 2023-12-25 RX ADMIN — CARVEDILOL 25 MG: 12.5 TABLET, FILM COATED ORAL at 21:04

## 2023-12-25 RX ADMIN — PIPERACILLIN SODIUM AND TAZOBACTAM SODIUM 3.38 G: 3; .375 INJECTION, SOLUTION INTRAVENOUS at 11:10

## 2023-12-25 RX ADMIN — SEVELAMER CARBONATE 800 MG: 800 TABLET, FILM COATED ORAL at 11:02

## 2023-12-25 RX ADMIN — SODIUM BICARBONATE 650 MG: 650 TABLET ORAL at 21:04

## 2023-12-25 ASSESSMENT — COGNITIVE AND FUNCTIONAL STATUS - GENERAL
MOVING TO AND FROM BED TO CHAIR: TOTAL
MOVING FROM LYING ON BACK TO SITTING ON SIDE OF FLAT BED WITH BEDRAILS: A LOT
TOILETING: TOTAL
TURNING FROM BACK TO SIDE WHILE IN FLAT BAD: A LOT
DRESSING REGULAR UPPER BODY CLOTHING: A LOT
STANDING UP FROM CHAIR USING ARMS: TOTAL
MOVING FROM LYING ON BACK TO SITTING ON SIDE OF FLAT BED WITH BEDRAILS: A LOT
CLIMB 3 TO 5 STEPS WITH RAILING: TOTAL
HELP NEEDED FOR BATHING: TOTAL
TURNING FROM BACK TO SIDE WHILE IN FLAT BAD: A LOT
STANDING UP FROM CHAIR USING ARMS: TOTAL
MOBILITY SCORE: 8
DAILY ACTIVITIY SCORE: 10
WALKING IN HOSPITAL ROOM: TOTAL
MOBILITY SCORE: 8
WALKING IN HOSPITAL ROOM: TOTAL
CLIMB 3 TO 5 STEPS WITH RAILING: TOTAL
PERSONAL GROOMING: A LOT
DRESSING REGULAR LOWER BODY CLOTHING: TOTAL
MOVING TO AND FROM BED TO CHAIR: TOTAL
EATING MEALS: A LITTLE

## 2023-12-25 ASSESSMENT — PAIN SCALES - GENERAL
PAINLEVEL_OUTOF10: 0 - NO PAIN
PAINLEVEL_OUTOF10: 0 - NO PAIN

## 2023-12-25 ASSESSMENT — ACTIVITIES OF DAILY LIVING (ADL): HOME_MANAGEMENT_TIME_ENTRY: 10

## 2023-12-25 NOTE — PROGRESS NOTES
History of Present Illness:  Patient 89-year-old male with concerns of bilateral lower extremity swelling redness.  Relatively poor historian history taken from discussion with staff and chart      Doing ok            Review of Systems: All other ROS reviewed and are negative other than as stated in HPI            Social History     Tobacco Use    Smoking status: Never    Smokeless tobacco: Never   Vaping Use    Vaping Use: Never used   Substance Use Topics    Alcohol use: Never    Drug use: Never         History reviewed. No pertinent past medical history.        History reviewed. No pertinent surgical history.        Current Facility-Administered Medications:     acetaminophen (Tylenol) tablet 650 mg, 650 mg, oral, q4h PRN, 650 mg at 12/23/23 2049 **OR** acetaminophen (Tylenol) oral liquid 650 mg, 650 mg, nasogastric tube, q4h PRN **OR** acetaminophen (Tylenol) suppository 650 mg, 650 mg, rectal, q4h PRN, Matthew Edge MD    calcitriol (Rocaltrol) capsule 0.25 mcg, 0.25 mcg, oral, Daily, Matthew Edge MD, 0.25 mcg at 12/24/23 1034    carvedilol (Coreg) tablet 25 mg, 25 mg, oral, BID, Matthew Edge MD, 25 mg at 12/23/23 2050    doxazosin (Cardura) tablet 4 mg, 4 mg, oral, Daily, Matthew Edge MD, 4 mg at 12/24/23 1035    ferrous sulfate (325 mg ferrous sulfate) tablet 1 tablet, 1 tablet, oral, BID with meals, Matthew Edge MD, 1 tablet at 12/24/23 1858    finasteride (Proscar) tablet 5 mg, 5 mg, oral, Daily, Matthew Edge MD, 5 mg at 12/24/23 1035    fluticasone (Flonase) nasal spray 1 spray, 1 spray, Each Nostril, Daily, Matthew Edge MD, 1 spray at 12/24/23 1033    heparin (porcine) injection 5,000 Units, 5,000 Units, subcutaneous, q8h, Matthew Edge MD, 5,000 Units at 12/24/23 1858    piperacillin-tazobactam-dextrose (Zosyn) IV 3.375 g, 3.375 g, intravenous, q12h, Stopped at 12/24/23 1530 **AND** sodium chloride 0.9% infusion, 10 mL/hr, intravenous, q12h, Matthew Edge MD, Last Rate: 10 mL/hr at  "12/24/23 1130, 10 mL/hr at 12/24/23 1130    polyethylene glycol (Glycolax, Miralax) packet 17 g, 17 g, oral, Daily PRN, Matthew Edge MD    sevelamer carbonate (Renvela) tablet 800 mg, 800 mg, oral, TID with meals, Matthew Edge MD, 800 mg at 12/24/23 1858    sodium bicarbonate tablet 650 mg, 650 mg, oral, BID, Matthew Edge MD, 650 mg at 12/24/23 1035    torsemide (Demadex) tablet 40 mg, 40 mg, oral, Daily, Matthew Edge MD, 40 mg at 12/24/23 1034    traMADol (Ultram) tablet 50 mg, 50 mg, oral, q12h PRN, Matthew Edge MD, 50 mg at 12/24/23 0206    vancomycin (Vancocin) placeholder, , miscellaneous, Daily PRN, Matthew Edge MD     No Known Allergies       No family history on file.      Physical Exam:    Blood pressure 129/63, pulse 55, temperature 35.6 °C (96.1 °F), temperature source Temporal, resp. rate 16, height 1.778 m (5' 10\"), weight 62.1 kg (136 lb 14.5 oz), SpO2 97 %.  General: Patient appears ok at the present time. NAD  Skin: was dressed by wound care films reviewed  HEENT:  Neck is supple, No subconjunctival hemorrhages, no oral exudates  Heart: S1 S2  Lungs: clear bilaterally  Abdomen: soft, ND, NTTP,  Back :no CVA tenderness  Extrem: No edema, non tender  Neuro exam: CN II-XII intact  Psych: cooperative    Labs:  I have reviewed all lab results by electronic record, including most recent CBC, metabolic panel, and pertinent abnormalities were addressed from an infectious disease perspective.  Trends are being monitored over time.    Lab Results   Component Value Date    WBC 6.8 12/24/2023    HGB 7.7 (L) 12/24/2023    HCT 23.6 (L) 12/24/2023    MCV 89 12/24/2023     12/24/2023     Lab Results   Component Value Date    GLUCOSE 94 12/24/2023    CALCIUM 7.9 (L) 12/24/2023     12/24/2023    K 5.5 (H) 12/24/2023    CO2 20 (L) 12/24/2023    CL 99 12/24/2023     (HH) 12/24/2023    CREATININE 11.97 (H) 12/24/2023       Radiology:  I have reviewed imaging results per electronic record " and most pertinent abnormalities are being addressed from an infectious disease standpoint.            ASSESSMENT:  Problem List Items Addressed This Visit          Infectious Diseases    Cellulitis of both lower extremities - Primary     Other Visit Diagnoses       ESRD (end stage renal disease) (CMS/[facility])        Elevated troponin        Normocytic anemia               Contact dermatitis likely irritant moisture and stasis dermatitis chronic leg swelling with partial-thickness skin loss ,  possibly a component of cellulitis and wounds are likely colonized with bacteria given the larger environment.Also consider poor healing soft issues with kidney function, calciphylaxis    PLAN:   Needs aggressive wound care, and mitigation strategies to avoid moisture which will likely be challenging given kidney disease and edema      So far pseudomonas growing small numbers from moist environing

## 2023-12-25 NOTE — PROGRESS NOTES
"Physical Therapy    Physical Therapy Treatment    Patient Name: Joshua Gotti  MRN: [id number]  Today's Date: 12/25/2023  Time Calculation  Start Time: 0851  Stop Time: 0918  Time Calculation (min): 27 min       Assessment/Plan   PT Assessment  End of Session Communication: Bedside nurse  End of Session Patient Position: Bed, 3 rail up     PT Plan  Treatment/Interventions: Bed mobility, Transfer training  PT Plan: Skilled PT  PT Frequency: 3 times per week    General Visit Information:   PT  Visit  PT Received On: 12/25/23  General  Co-Treatment: OT  Co-Treatment Reason:  (to obtain maximal pt. function and to assure pt. safety.)  Prior to Session Communication: Bedside nurse  Patient Position Received: Bed, 3 rail up  General Comment:  (pt. agreeable to therapy stating \"everything hurts\")    Subjective   Precautions:  Precautions  Precautions Comment:  (Falls, medical)    Treatments:    Bed Mobility  Bed Mobility:  (segmental rolling trials with use of rail and max. x 2 voicing difficulty with lying flat)    Transfers  Transfer:  (supine to sit eith max. x 1, sit to stand with max. x 2 and fww posture forward poor stand tolerance for cleansing with nursing present. seated scoot self attempted but required max. x 2)    Outcome Measures:  [facility] Basic Mobility  Turning from your back to your side while in a flat bed without using bedrails: A lot  Moving from lying on your back to sitting on the side of a flat bed without using bedrails: A lot  Moving to and from bed to chair (including a wheelchair): Total  Standing up from a chair using your arms (e.g. wheelchair or bedside chair): Total  To walk in hospital room: Total  Climbing 3-5 steps with railing: Total  Basic Mobility - Total Score: 8     EDUCATION:  Outpatient Education  Individual(s) Educated: Patient  Education Provided: Fall Risk, Posture, Other (positioning for pressure relief)  Education Comment:  (verbal and tactile education for bed mobiilty and " positioning presenting incontinent of bowels and unaware. nursing present.)    Encounter Problems       Encounter Problems (Active)       PT Problem       Pt will demonstrate min A with all bed mobility   (Progressing)       Start:  12/22/23    Expected End:  01/05/24            Pt will demonstrate sit to stand and bed/chair transfers with Mod A with WW.   (Not Progressing)       Start:  12/22/23    Expected End:  01/05/24            Pt will tolerate 15 reps x2 LE seated/supine therapeutic exercise for LE strengthening and endurance  (Progressing)       Start:  12/22/23    Expected End:  01/05/24            pt will tolerate sitting EOB >5 minutes with SBA (Not Progressing)       Start:  12/22/23    Expected End:  01/05/24               Pain - Adult

## 2023-12-25 NOTE — PROGRESS NOTES
"Vancomycin Dosing by Pharmacy- FOLLOW UP    Joshua Gotti is a 89 y.o. year old male who Pharmacy has been consulted for vancomycin dosing for cellulitis, skin and soft tissue. Based on the patient's indication and renal status this patient is being dosed based on a goal trough/random level of 10-15.     Renal function is currently declined.    Current vancomycin dose: being dosed by level. Last dose 1 gram 12/22/23 @ 2000    Most recent random level: 19.5 mcg/mL    Visit Vitals  /60   Pulse 56   Temp 35.6 °C (96.1 °F)   Resp 16        Lab Results   Component Value Date    CREATININE 11.97 (H) 12/24/2023    CREATININE 12.19 (H) 12/23/2023    CREATININE 12.09 (H) 12/22/2023    CREATININE 11.86 (H) 12/21/2023        Patient weight is No results found for: \"PTWEIGHT\"    No results found for: \"CULTURE\"     I/O last 3 completed shifts:  In: 160 (2.6 mL/kg) [I.V.:10 (0.2 mL/kg); IV Piggyback:150]  Out: 500 (8.1 mL/kg) [Urine:500 (0.2 mL/kg/hr)]  Weight: 62.1 kg   [unfilled]    No results found for: \"PATIENTTEMP\"     Assessment/Plan    Above goal random/trough level using traditional dosing. Will continue to dose by level. Re-dose when random level at or below goal.  The next level will be obtained on 12/26/23 at 1st AM. May be obtained sooner if clinically indicated.   Will continue to monitor renal function daily while on vancomycin and order serum creatinine at least every 48 hours if not already ordered.  Follow for continued vancomycin needs, clinical response, and signs/symptoms of toxicity.       Natlaie Méndez, PharmD           "

## 2023-12-25 NOTE — PROGRESS NOTES
12/25/23 1428   Discharge Planning   Home or Post Acute Services Post acute facilities (Rehab/SNF/etc)   Type of Post Acute Facility Services Rehab;Skilled nursing   Patient expects to be discharged to: #1 [facility] SN[facility] , #2 [facility] in [place]   Does the patient need discharge transport arranged? Yes   RoundTrip coordination needed? Yes     [facility] SNF can accept and states may have bed open on Tuesday, need to check back on 12/26/23 to see if bed available and if so have team initiate SNF precert. Spoke with pts daughter and notified her of this information, and inquired about #2 SNF choice if still no bed available. #2 SNF choice is [facility] in [place], but again states really wanted [facility] SNF due to close to her home. CT team will continue to monitor case for progression and DC planning.

## 2023-12-25 NOTE — CARE PLAN
Problem: Nutrition  Goal: Oral intake greater 75%  Outcome: Progressing  Goal: Consume prescribed supplement  Outcome: Progressing  Goal: Adequate PO fluid intake  Outcome: Progressing  Goal: Nutrition support is meeting 75% of nutrient needs  Outcome: Progressing  Goal: Lab values WNL  Outcome: Progressing  Goal: Electrolytes WNL  Outcome: Progressing  Goal: Promote healing  Outcome: Progressing  Goal: Maintain stable weight  Outcome: Progressing     Problem: Fall/Injury  Goal: Not fall by end of shift  Outcome: Progressing  Goal: Be free from injury by end of the shift  Outcome: Progressing  Goal: Verbalize understanding of personal risk factors for fall in the hospital  Outcome: Progressing  Goal: Verbalize understanding of risk factor reduction measures to prevent injury from fall in the home  Outcome: Progressing  Goal: Use assistive devices by end of the shift  Outcome: Progressing  Goal: Pace activities to prevent fatigue by end of the shift  Outcome: Progressing     Problem: Skin  Goal: Decreased wound size/increased tissue granulation at next dressing change  Outcome: Progressing  Goal: Participates in plan/prevention/treatment measures  Outcome: Progressing  Goal: Prevent/manage excess moisture  Outcome: Progressing  Goal: Prevent/minimize sheer/friction injuries  Outcome: Progressing  Goal: Promote/optimize nutrition  Outcome: Progressing  Goal: Promote skin healing  Outcome: Progressing     Problem: Pain - Adult  Goal: Verbalizes/displays adequate comfort level or baseline comfort level  Outcome: Progressing     Problem: Discharge Planning  Goal: Discharge to home or other facility with appropriate resources  Outcome: Progressing     Problem: Chronic Conditions and Co-morbidities  Goal: Patient's chronic conditions and co-morbidity symptoms are monitored and maintained or improved  Outcome: Progressing   The patient's goals for the shift include      The clinical goals for the shift include patient  will remain safe and free of injury this shift

## 2023-12-25 NOTE — PROGRESS NOTES
Occupational Therapy    OT Treatment    Patient Name: Joshua Gotti  MRN: [id number]  Today's Date: 12/25/2023  Time Calculation  Start Time: 0850  Stop Time: 0915  Time Calculation (min): 25 min         Assessment:  End of Session Communication: Bedside nurse  End of Session Patient Position: Bed, 3 rail up, Alarm on         Subjective   Previous Visit Info:  OT Last Visit  OT Received On: 12/25/23  General:  General  Co-Treatment: PT (cotreat with PTA to increase pt safety and indep with functional transfers/ADLS)  Prior to Session Communication: Bedside nurse  Patient Position Received: Bed, 2 rail up, Alarm on  General Comment: fall safety precautions    Pain:  Pain Assessment  Pain Score: 0 - No pain         Activities of Daily Living: UE Dressing  UE Dressing Comments: pt seated EOB required max A for UB dressing tasks, pt demos F- unsupported sitting balance, pt required max A vc for maintaining upright balance  Functional Standing Tolerance:     Bed Mobility/Transfers: Transfers  Transfer:  (pt performing x 2 sit < > stand transfers with use of FWW, pt incontient of stool requiring total A for joseph hygiene. pt educated on safe hand placement when performing sit  < > stand transfers. very limited standing tolerance noted pt presents with B knees flexed and unable to fully stand upright, max VC provided to fix posture )      Outcome Measures:[facility] Daily Activity  Putting on and taking off regular lower body clothing: Total  Bathing (including washing, rinsing, drying): Total  Putting on and taking off regular upper body clothing: A lot  Toileting, which includes using toilet, bedpan or urinal: Total  Taking care of personal grooming such as brushing teeth: A lot  Eating Meals: A little  Daily Activity - Total Score: 10        Education Documentation  ADL Training, taught by SHAMEKA Castro at 12/25/2023  9:38 AM.  Learner: Patient  Readiness: Acceptance  Method: Explanation  Response: Needs  Reinforcement  Comment: pt educated on OT POC and fall safety prevention    Education Comments  No comments found.        OP EDUCATION:       Goals:  Encounter Problems       Encounter Problems (Active)       OT Goals       Pt will dress UB with SBA (Progressing)       Start:  12/22/23    Expected End:  01/05/24            Pt will transfer to bed, chair, toilet with min A  (Progressing)       Start:  12/22/23    Expected End:  01/05/24            Pt will demo good dyn sitting balance at EOB (Progressing)       Start:  12/22/23    Expected End:  01/05/24

## 2023-12-25 NOTE — PROGRESS NOTES
Medical Group Progress Note  ASSESSMENT & PLAN:     Bilateral lower extremity cellulitis  Pseudomonas wound infection - likely colonized  - Will continue vancomycin and Zosyn at this time  - ID following  - Wound care following - twice daily dressing changes with Vashe  - Wound cultures collected 12/22, growing Pseudomonas and rare enterobaceter  - See updated image below or in media section from today 12/24 - slight overall improvement     CKD stage V  - Renal function at baseline  - Patient is declining hemodialysis at this time (and was as an outpatient) - as a result nephrology not consulted    Bilateral buttock pressure injury - stage II - present on admission  - See media section for images from wound care evaluation  - Present on admission  - Local wound care     Essential hypertension  BPH  Hyperlipidemia  Diastolic CHF  - Resume home medications once reconciled     Anemia of chronic disease  - Hemoglobin 7.6 (baseline not available as [facility])     Elevated troponin  - Troponin 171, 158 likely in setting of CKD stage V  - No chest pain, low concerns for ACS with no EKG changes as well     VTE prophylaxis: Heparin subcutaneous    Disposition/Daily update: Wound cultures this morning growing Pseudomonas and Enterobacter cloacae, continue antibiotics as per above will likely discontinue on discharge per ID recommendations.  Significant proved in his bilateral lower extremity cellulitis, erythema, swelling since admission.  However still awaiting pre-CERT and facility to accept at this time.      ---Of note, this documentation is completed using the Dragon Dictation system (voice recognition software). There may be spelling and/or grammatical errors that were not corrected prior to final submission.---      Matthew Edge MD    SUBJECTIVE     Patient was seen and examined bedside this morning.  Had no acute events overnight.  States that he is not having pain in his lower legs at the site of  his rash this morning.  He is aware of pending pre-CERT for SNF.    OBJECTIVE:     Last Recorded Vitals:  Vitals:    12/24/23 1400 12/24/23 2026 12/25/23 0442 12/25/23 1101   BP: 124/60 129/63 127/65 122/59   BP Location:  Right arm Right arm Left arm   Patient Position:  Lying Lying    Pulse: 56 55 67 70   Resp:  16 16 16   Temp: 35.6 °C (96.1 °F) 35.6 °C (96.1 °F) 36.5 °C (97.7 °F) 36.7 °C (98.1 °F)   TempSrc:  Temporal Temporal Temporal   SpO2: 96% 97% 95% 98%   Weight:       Height:         Last I/O:  I/O last 3 completed shifts:  In: 160 (2.6 mL/kg) [I.V.:10 (0.2 mL/kg); IV Piggyback:150]  Out: 1100 (17.7 mL/kg) [Urine:1100 (0.5 mL/kg/hr)]  Weight: 62.1 kg     Physical Exam  Vitals reviewed.   Constitutional:       General: He is not in acute distress.     Appearance: Normal appearance. He is normal weight. He is not ill-appearing.   HENT:      Head: Normocephalic and atraumatic.      Mouth/Throat:      Comments: Poor dentition, most of teeth are missing  Eyes:      Extraocular Movements: Extraocular movements intact.      Conjunctiva/sclera: Conjunctivae normal.   Cardiovascular:      Rate and Rhythm: Normal rate and regular rhythm.      Pulses: Normal pulses.      Heart sounds: Normal heart sounds.   Pulmonary:      Effort: Pulmonary effort is normal.      Breath sounds: Normal breath sounds.   Abdominal:      General: Abdomen is flat. Bowel sounds are normal.      Palpations: Abdomen is soft.      Tenderness: There is no abdominal tenderness. There is no guarding.   Musculoskeletal:         General: Normal range of motion.      Cervical back: Normal range of motion and neck supple.      Right lower leg: Edema present.      Left lower leg: Edema present.      Comments: Improving lower extremity swelling and erythema   Skin:     General: Skin is warm.      Findings: Erythema and rash present.   Neurological:      General: No focal deficit present.      Mental Status: He is alert and oriented to person, place,  and time. Mental status is at baseline.     Inpatient Medications:  calcitriol, 0.25 mcg, oral, Daily  carvedilol, 25 mg, oral, BID  doxazosin, 4 mg, oral, Daily  ferrous sulfate (325 mg ferrous sulfate), 1 tablet, oral, BID with meals  finasteride, 5 mg, oral, Daily  fluticasone, 1 spray, Each Nostril, Daily  heparin (porcine), 5,000 Units, subcutaneous, q8h  piperacillin-tazobactam-dextrose, 3.375 g, intravenous, q12h   And  sodium chloride 0.9%, 10 mL/hr, intravenous, q12h  sevelamer carbonate, 800 mg, oral, TID with meals  sodium bicarbonate, 650 mg, oral, BID  sodium zirconium cyclosilicate, 5 g, oral, Every other day  torsemide, 40 mg, oral, Daily    PRN Medications  PRN medications: acetaminophen **OR** acetaminophen **OR** acetaminophen, polyethylene glycol, traMADol, vancomycin  Continuous Medications:     LABS AND IMAGING:     Labs:  Results from last 7 days   Lab Units 12/25/23  0626 12/24/23  0524 12/23/23  0551   WBC AUTO x10*3/uL 9.9 6.8 6.2   RBC AUTO x10*6/uL 3.00* 2.65* 2.47*   HEMOGLOBIN g/dL 8.8* 7.7* 7.2*   HEMATOCRIT % 26.6* 23.6* 22.3*   MCV fL 89 89 90   MCH pg 29.3 29.1 29.1   MCHC g/dL 33.1 32.6 32.3   RDW % 14.3 14.1 14.1   PLATELETS AUTO x10*3/uL 193 165 179       Results from last 7 days   Lab Units 12/25/23  0626 12/24/23  0524 12/23/23  0551 12/22/23  0753 12/21/23  1535   SODIUM mmol/L 138 137 137   < > 138   POTASSIUM mmol/L 5.6* 5.5* 5.2   < > 5.3   CHLORIDE mmol/L 98 99 100   < > 97*   CO2 mmol/L 21 20* 18*   < > 18*   BUN mg/dL 200* 202* 184*   < > 201*   CREATININE mg/dL 12.47* 11.97* 12.19*   < > 11.86*   GLUCOSE mg/dL 88 94 91   < > 160*   PROTEIN TOTAL g/dL  --   --   --   --  7.7   CALCIUM mg/dL 7.9* 7.9* 7.6*   < > 8.3*   BILIRUBIN TOTAL mg/dL  --   --   --   --  0.4   ALK PHOS U/L  --   --   --   --  77   AST U/L  --   --   --   --  8*   ALT U/L  --   --   --   --  6*    < > = values in this interval not displayed.           Results from last 7 days   Lab Units  12/21/23  1618 12/21/23  1535   TROPHS ng/L 158* 171*       Imaging:  ECG 12 lead  Sinus rhythm with 1st degree AV block  Incomplete left bundle branch block  ST & T wave abnormality, consider lateral ischemia  Abnormal ECG  No previous ECGs available    See ED provider note for full interpretation and clinical correlation  XR chest 1 view  Narrative: Interpreted By:  Noris Erickson,   STUDY:  XR CHEST 1 VIEW;  12/21/2023 4:10 pm      INDICATION:  Signs/Symptoms:peripheral edema.      COMPARISON:  None.      ACCESSION NUMBER(S):  [id number]      ORDERING CLINICIAN:  ERIC GREGG      FINDINGS:  Soft tissue fold overlies and obscures the central upper chest.  Patient is slightly rotated. Reverse lordotic projection obtained. No  definite focal infiltrate, pleural effusion or pneumothorax as  visualized. The cardiac silhouette is mildly enlarged. Aortic  tortuosity.      Impression: Mildly enlarged cardiac silhouette. No definite focal infiltrate.      MACRO:  None.      Signed by: Noris Erickson 12/21/2023 4:32 PM  Dictation workstation:   [id number]

## 2023-12-26 VITALS
HEIGHT: 70 IN | RESPIRATION RATE: 18 BRPM | TEMPERATURE: 96.8 F | WEIGHT: 136.91 LBS | DIASTOLIC BLOOD PRESSURE: 54 MMHG | SYSTOLIC BLOOD PRESSURE: 91 MMHG | HEART RATE: 97 BPM | BODY MASS INDEX: 19.6 KG/M2 | OXYGEN SATURATION: 93 %

## 2023-12-26 LAB
ANION GAP SERPL CALC-SCNC: 28 MMOL/L (ref 10–20)
BASOPHILS # BLD AUTO: 0.01 X10*3/UL (ref 0–0.1)
BASOPHILS NFR BLD AUTO: 0.1 %
BUN SERPL-MCNC: 194 MG/DL (ref 6–23)
CALCIUM SERPL-MCNC: 7.9 MG/DL (ref 8.6–10.3)
CHLORIDE SERPL-SCNC: 99 MMOL/L (ref 98–107)
CO2 SERPL-SCNC: 18 MMOL/L (ref 21–32)
CREAT SERPL-MCNC: 12.79 MG/DL (ref 0.5–1.3)
EOSINOPHIL # BLD AUTO: 0.02 X10*3/UL (ref 0–0.4)
EOSINOPHIL NFR BLD AUTO: 0.3 %
ERYTHROCYTE [DISTWIDTH] IN BLOOD BY AUTOMATED COUNT: 14 % (ref 11.5–14.5)
GFR SERPL CREATININE-BSD FRML MDRD: 3 ML/MIN/1.73M*2
GLUCOSE SERPL-MCNC: 103 MG/DL (ref 74–99)
HCT VFR BLD AUTO: 24.8 % (ref 41–52)
HGB BLD-MCNC: 8.1 G/DL (ref 13.5–17.5)
IMM GRANULOCYTES # BLD AUTO: 0.08 X10*3/UL (ref 0–0.5)
IMM GRANULOCYTES NFR BLD AUTO: 1 % (ref 0–0.9)
LYMPHOCYTES # BLD AUTO: 0.4 X10*3/UL (ref 0.8–3)
LYMPHOCYTES NFR BLD AUTO: 5.2 %
MCH RBC QN AUTO: 29 PG (ref 26–34)
MCHC RBC AUTO-ENTMCNC: 32.7 G/DL (ref 32–36)
MCV RBC AUTO: 89 FL (ref 80–100)
MONOCYTES # BLD AUTO: 0.5 X10*3/UL (ref 0.05–0.8)
MONOCYTES NFR BLD AUTO: 6.5 %
NEUTROPHILS # BLD AUTO: 6.67 X10*3/UL (ref 1.6–5.5)
NEUTROPHILS NFR BLD AUTO: 86.9 %
NRBC BLD-RTO: 0 /100 WBCS (ref 0–0)
PLATELET # BLD AUTO: 178 X10*3/UL (ref 150–450)
POTASSIUM SERPL-SCNC: 5.4 MMOL/L (ref 3.5–5.3)
RBC # BLD AUTO: 2.79 X10*6/UL (ref 4.5–5.9)
SODIUM SERPL-SCNC: 140 MMOL/L (ref 136–145)
VANCOMYCIN SERPL-MCNC: 20.6 UG/ML (ref 5–20)
WBC # BLD AUTO: 7.7 X10*3/UL (ref 4.4–11.3)

## 2023-12-26 PROCEDURE — 36415 COLL VENOUS BLD VENIPUNCTURE: CPT

## 2023-12-26 PROCEDURE — 2500000005 HC RX 250 GENERAL PHARMACY W/O HCPCS: Performed by: STUDENT IN AN ORGANIZED HEALTH CARE EDUCATION/TRAINING PROGRAM

## 2023-12-26 PROCEDURE — 80202 ASSAY OF VANCOMYCIN: CPT

## 2023-12-26 PROCEDURE — 2500000001 HC RX 250 WO HCPCS SELF ADMINISTERED DRUGS (ALT 637 FOR MEDICARE OP): Performed by: STUDENT IN AN ORGANIZED HEALTH CARE EDUCATION/TRAINING PROGRAM

## 2023-12-26 PROCEDURE — 36415 COLL VENOUS BLD VENIPUNCTURE: CPT | Performed by: STUDENT IN AN ORGANIZED HEALTH CARE EDUCATION/TRAINING PROGRAM

## 2023-12-26 PROCEDURE — 2500000004 HC RX 250 GENERAL PHARMACY W/ HCPCS (ALT 636 FOR OP/ED): Performed by: STUDENT IN AN ORGANIZED HEALTH CARE EDUCATION/TRAINING PROGRAM

## 2023-12-26 PROCEDURE — 96372 THER/PROPH/DIAG INJ SC/IM: CPT | Performed by: STUDENT IN AN ORGANIZED HEALTH CARE EDUCATION/TRAINING PROGRAM

## 2023-12-26 PROCEDURE — 99239 HOSP IP/OBS DSCHRG MGMT >30: CPT | Performed by: STUDENT IN AN ORGANIZED HEALTH CARE EDUCATION/TRAINING PROGRAM

## 2023-12-26 PROCEDURE — 80048 BASIC METABOLIC PNL TOTAL CA: CPT | Performed by: STUDENT IN AN ORGANIZED HEALTH CARE EDUCATION/TRAINING PROGRAM

## 2023-12-26 PROCEDURE — 85025 COMPLETE CBC W/AUTO DIFF WBC: CPT | Performed by: STUDENT IN AN ORGANIZED HEALTH CARE EDUCATION/TRAINING PROGRAM

## 2023-12-26 RX ORDER — TRAMADOL HYDROCHLORIDE 50 MG/1
50 TABLET ORAL EVERY 12 HOURS PRN
Qty: 10 TABLET | Refills: 0 | Status: SHIPPED | OUTPATIENT
Start: 2023-12-26

## 2023-12-26 RX ORDER — CIPROFLOXACIN 250 MG/1
250 TABLET, FILM COATED ORAL DAILY
Qty: 5 TABLET | Refills: 0
Start: 2023-12-26 | End: 2023-12-31

## 2023-12-26 RX ORDER — LIDOCAINE 560 MG/1
1 PATCH PERCUTANEOUS; TOPICAL; TRANSDERMAL DAILY
Status: DISCONTINUED | OUTPATIENT
Start: 2023-12-26 | End: 2023-12-26 | Stop reason: HOSPADM

## 2023-12-26 RX ADMIN — SODIUM CHLORIDE 10 ML/HR: 9 INJECTION, SOLUTION INTRAVENOUS at 08:37

## 2023-12-26 RX ADMIN — SODIUM BICARBONATE 650 MG: 650 TABLET ORAL at 08:36

## 2023-12-26 RX ADMIN — FERROUS SULFATE TAB 325 MG (65 MG ELEMENTAL FE) 1 TABLET: 325 (65 FE) TAB at 08:36

## 2023-12-26 RX ADMIN — CARVEDILOL 25 MG: 12.5 TABLET, FILM COATED ORAL at 08:36

## 2023-12-26 RX ADMIN — LIDOCAINE 1 PATCH: 4 PATCH TOPICAL at 12:22

## 2023-12-26 RX ADMIN — TRAMADOL HYDROCHLORIDE 50 MG: 50 TABLET, COATED ORAL at 08:36

## 2023-12-26 RX ADMIN — FLUTICASONE PROPIONATE 1 SPRAY: 50 SPRAY, METERED NASAL at 08:36

## 2023-12-26 RX ADMIN — FINASTERIDE 5 MG: 5 TABLET, FILM COATED ORAL at 08:36

## 2023-12-26 RX ADMIN — DOXAZOSIN 4 MG: 4 TABLET ORAL at 08:37

## 2023-12-26 RX ADMIN — HEPARIN SODIUM 5000 UNITS: 5000 INJECTION INTRAVENOUS; SUBCUTANEOUS at 08:36

## 2023-12-26 RX ADMIN — SEVELAMER CARBONATE 800 MG: 800 TABLET, FILM COATED ORAL at 12:22

## 2023-12-26 RX ADMIN — HEPARIN SODIUM 5000 UNITS: 5000 INJECTION INTRAVENOUS; SUBCUTANEOUS at 01:28

## 2023-12-26 RX ADMIN — PIPERACILLIN SODIUM AND TAZOBACTAM SODIUM 3.38 G: 3; .375 INJECTION, SOLUTION INTRAVENOUS at 08:37

## 2023-12-26 RX ADMIN — SEVELAMER CARBONATE 800 MG: 800 TABLET, FILM COATED ORAL at 08:36

## 2023-12-26 RX ADMIN — SEVELAMER CARBONATE 800 MG: 800 TABLET, FILM COATED ORAL at 17:27

## 2023-12-26 RX ADMIN — TORSEMIDE 40 MG: 20 TABLET ORAL at 08:36

## 2023-12-26 RX ADMIN — ACETAMINOPHEN 650 MG: 325 TABLET ORAL at 08:36

## 2023-12-26 RX ADMIN — CALCITRIOL 0.25 MCG: 0.25 CAPSULE, LIQUID FILLED ORAL at 08:36

## 2023-12-26 RX ADMIN — FERROUS SULFATE TAB 325 MG (65 MG ELEMENTAL FE) 1 TABLET: 325 (65 FE) TAB at 17:27

## 2023-12-26 NOTE — PROGRESS NOTES
Occupational Therapy                 Therapy Communication Note    Patient Name: Joshua Gotti  MRN: [id number]  Today's Date: 12/26/2023     Discipline: Occupational Therapy    Missed Visit Reason: Missed Visit Reason: Patient refused (pt refused OT tx depsite max encouragement, pt encouraged to sit EOB for breakfast however pulling blanket over his head and saying no not today. TCC and RN informed)    Missed Time: Attempt 0854    Comment:

## 2023-12-26 NOTE — PROGRESS NOTES
InsFabrice Suarez has been obtained.  Pt. Will discharge this date to the [facility] snf at 2:00 pm via ambulance. Spoke with ame Judge who is aware and in agreement to transfer.

## 2023-12-26 NOTE — CARE PLAN
The patient's goals for the shift include      The clinical goals for the shift include patient will remain safe and free of injury this shift    Over the shift, the patient did not make progress toward the following goals. Barriers to progression include . Recommendations to address these barriers include .

## 2023-12-26 NOTE — DISCHARGE INSTRUCTIONS
Patient is CKD stage V, he and family are not seeking hemodialysis.  Worsening renal function is at baseline.

## 2023-12-26 NOTE — PROGRESS NOTES
Per case cruz still no beds available , referral sent to next choice [facility], awaiting response and will require ins. Precert/anthem.  Spoke with ame Judge to provide update.

## 2023-12-26 NOTE — PROGRESS NOTES
"Vancomycin Dosing by Pharmacy- FOLLOW UP    Joshua Gotti is a 89 y.o. year old male who Pharmacy has been consulted for vancomycin dosing for cellulitis, skin and soft tissue. Based on the patient's indication and renal status this patient is being dosed based on a goal trough/random level of 10-15.     Renal function is currently stable.    Current vancomycin dose: vancomycin by levels.    Most recent random level: 20.6 mcg/mL    Visit Vitals  /58   Pulse 108   Temp 36.8 °C (98.2 °F)   Resp 18        Lab Results   Component Value Date    CREATININE 12.79 (H) 12/26/2023    CREATININE 12.47 (H) 12/25/2023    CREATININE 11.97 (H) 12/24/2023    CREATININE 12.19 (H) 12/23/2023        Patient weight is No results found for: \"PTWEIGHT\"    No results found for: \"CULTURE\"     I/O last 3 completed shifts:  In: 630 (10.1 mL/kg) [P.O.:480; IV Piggyback:150]  Out: 2300 (37 mL/kg) [Urine:2300 (1 mL/kg/hr)]  Weight: 62.1 kg   [unfilled]    No results found for: \"PATIENTTEMP\"     Assessment/Plan    Within goal random/trough level. No dose due today.  The next level will be obtained on 12/29/2023 at 0500. May be obtained sooner if clinically indicated.   Will continue to monitor renal function daily while on vancomycin and order serum creatinine at least every 48 hours if not already ordered.  Follow for continued vancomycin needs, clinical response, and signs/symptoms of toxicity.       SOBEIDA Aly. Ph.            "

## 2023-12-26 NOTE — PROGRESS NOTES
Removed dressings from BLE. Open wounds noted with scant straw color drainage seen. Wound beds are pink to dark red, surrounding tissue intact. These wounds appear vascular. Irrigated with Vashe and applied xeroform dressing and secured with ace wrap from base of toes to knee.  Wound Care Progress Note     Visit Date: 12/26/2023      Patient Name: Joshua Gotti         MRN: [id number]                Reason for Visit: Wound BLE        Wound History: 2 weeks     Pertinent Labs:   Albumin   Date Value Ref Range Status   12/21/2023 2.9 (L) 3.4 - 5.0 g/dL Final           Wound Assessment:           NEW    External Urinary Catheter Male (Active)   Placement Date/Time: 12/21/23 2000   Hand Hygiene Completed: Yes  External Catheter Type: Male  External Urinary Catheter Sizes: Other (Comment)   Number of days: 4     External Urinary Catheter Male (Active)   Output (mL) 600 mL 12/26/23 0652                   Wound 12/21/23 Pressure Injury Perineum (Active)   Date First Assessed/Time First Assessed: 12/21/23 2000   Present on Original Admission: Yes  Hand Hygiene Completed: Yes  Primary Wound Type: Pressure Injury  Location: Perineum   Number of days: 4       Wound 12/21/23 Venous Ulcer Pretibial Right (Active)   Date First Assessed/Time First Assessed: 12/21/23 2000   Present on Original Admission: Yes  Hand Hygiene Completed: Yes  Primary Wound Type: Venous Ulcer  Location: Pretibial  Wound Location Orientation: Right   Number of days: 4       Wound 12/21/23 Venous Ulcer Pretibial Left (Active)   Date First Assessed/Time First Assessed: 12/21/23 2000   Present on Original Admission: Yes  Hand Hygiene Completed: Yes  Primary Wound Type: Venous Ulcer  Location: Pretibial  Wound Location Orientation: Left   Number of days: 4       Wound 12/22/23 Pressure Injury Buttocks Right (Active)   Date First Assessed/Time First Assessed: 12/22/23 1000   Present on Original Admission: Yes  Hand Hygiene Completed: Yes  Primary Wound Type:  Pressure Injury  Location: Buttocks  Wound Location Orientation: Right   Number of days: 4       Wound 12/22/23 Pressure Injury Buttocks Left (Active)   Date First Assessed/Time First Assessed: 12/22/23 1000   Present on Original Admission: Yes  Hand Hygiene Completed: Yes  Primary Wound Type: Pressure Injury  Location: Buttocks  Wound Location Orientation: Left   Number of days: 4       Wound 12/24/23 Skin Tear Elbow Dorsal;Right (Active)   Date First Assessed/Time First Assessed: 12/24/23 0230   Primary Wound Type: (c) Skin Tear  Location: Elbow  Wound Location Orientation: (c) Dorsal;Right   Number of days: 2     Wound 12/21/23 Pressure Injury Perineum (Active)   Site Assessment Dry;Red 12/26/23 0851   Liya-Wound Assessment Clean;Intact;Fragile 12/25/23 2104   Pressure Injury Stage 2 12/26/23 0851   Shape round 12/25/23 2104   Drainage Description None 12/22/23 1959   Drainage Amount None 12/22/23 1959   Dressing Moisture barrier 12/25/23 2104       Wound 12/21/23 Venous Ulcer Pretibial Right (Active)   Site Assessment Clean;Dry;Red 12/25/23 2104   Drainage Description Yellow 12/24/23 0945   Drainage Amount Small 12/24/23 0945   Dressing Moist to dry 12/26/23 0851   Dressing Changed Changed 12/26/23 0851   Dressing Status Clean;Dry 12/26/23 0851       Wound 12/21/23 Venous Ulcer Pretibial Left (Active)   Site Assessment Clean;Dry;Red 12/25/23 2104   Drainage Description Yellow 12/24/23 0945   Drainage Amount Small 12/24/23 0945   Dressing Moist to dry 12/26/23 0851   Dressing Changed Changed 12/26/23 0851   Dressing Status Clean;Dry 12/26/23 0851       Wound 12/22/23 Pressure Injury Buttocks Right (Active)   Site Assessment Clean;Fragile;Red 12/25/23 2104   Pressure Injury Stage 2 12/26/23 0851   Drainage Amount None 12/23/23 0930   Dressing Moisture barrier 12/26/23 0851   Dressing Changed Changed 12/24/23 0945   Dressing Status Clean;Dry 12/24/23 2300       Wound 12/22/23 Pressure Injury Buttocks Left (Active)    Site Assessment Red 12/25/23 2104   Pressure Injury Stage 2 12/23/23 0930   Drainage Amount None 12/22/23 1959   Dressing Moisture barrier 12/26/23 0851       Wound 12/24/23 Skin Tear Elbow Dorsal;Right (Active)   Dressing Other (Comment) 12/24/23 0945                   Wound Team Plan: Continue with current dressing changes.     Long Rainey LPN  12/26/2023  3:09 PM

## 2023-12-26 NOTE — PROGRESS NOTES
History of Present Illness:  Patient 89-year-old male with concerns of bilateral lower extremity swelling redness.  Relatively poor historian history taken from discussion with staff and chart      Doing ok            Review of Systems: All other ROS reviewed and are negative other than as stated in HPI            Social History     Tobacco Use    Smoking status: Never    Smokeless tobacco: Never   Vaping Use    Vaping Use: Never used   Substance Use Topics    Alcohol use: Never    Drug use: Never         History reviewed. No pertinent past medical history.        History reviewed. No pertinent surgical history.        Current Facility-Administered Medications:     acetaminophen (Tylenol) tablet 650 mg, 650 mg, oral, q4h PRN, 650 mg at 12/26/23 08 **OR** acetaminophen (Tylenol) oral liquid 650 mg, 650 mg, nasogastric tube, q4h PRN **OR** acetaminophen (Tylenol) suppository 650 mg, 650 mg, rectal, q4h PRN, Matthew Edge MD    calcitriol (Rocaltrol) capsule 0.25 mcg, 0.25 mcg, oral, Daily, Matthew Edge MD, 0.25 mcg at 12/26/23 08    carvedilol (Coreg) tablet 25 mg, 25 mg, oral, BID, Matthew Edge MD, 25 mg at 12/26/23 08    doxazosin (Cardura) tablet 4 mg, 4 mg, oral, Daily, Matthew Edge MD, 4 mg at 12/26/23 08    ferrous sulfate (325 mg ferrous sulfate) tablet 1 tablet, 1 tablet, oral, BID with meals, Matthew Edge MD, 1 tablet at 12/26/23 08    finasteride (Proscar) tablet 5 mg, 5 mg, oral, Daily, Matthew Edge MD, 5 mg at 12/26/23 08    fluticasone (Flonase) nasal spray 1 spray, 1 spray, Each Nostril, Daily, Matthew Edge MD, 1 spray at 12/26/23 0836    heparin (porcine) injection 5,000 Units, 5,000 Units, subcutaneous, q8h, Matthew Edge MD, 5,000 Units at 12/26/23 08    piperacillin-tazobactam-dextrose (Zosyn) IV 3.375 g, 3.375 g, intravenous, q12h, Last Rate: 12.5 mL/hr at 12/26/23 0837, 3.375 g at 12/26/23 0837 **AND** sodium chloride 0.9% infusion, 10 mL/hr, intravenous, q12h,  "Matthew Edge MD, Last Rate: 10 mL/hr at 12/26/23 0837, 10 mL/hr at 12/26/23 0837    polyethylene glycol (Glycolax, Miralax) packet 17 g, 17 g, oral, Daily PRN, Matthew Edge MD, 17 g at 12/25/23 1102    sevelamer carbonate (Renvela) tablet 800 mg, 800 mg, oral, TID with meals, Matthew Edge MD, 800 mg at 12/26/23 0836    sodium bicarbonate tablet 650 mg, 650 mg, oral, BID, Matthew Edge MD, 650 mg at 12/26/23 0836    sodium zirconium cyclosilicate (Lokelma) packet 5 g, 5 g, oral, Every other day, Matthew Edge MD, 5 g at 12/25/23 1110    torsemide (Demadex) tablet 40 mg, 40 mg, oral, Daily, Matthew Edge MD, 40 mg at 12/26/23 0836    traMADol (Ultram) tablet 50 mg, 50 mg, oral, q12h PRN, Matthew Edge MD, 50 mg at 12/26/23 0836    vancomycin (Vancocin) placeholder, , miscellaneous, Daily PRN, Matthew Edge MD     No Known Allergies       No family history on file.      Physical Exam:    Blood pressure 127/65, pulse 97, temperature 36 °C (96.8 °F), temperature source Temporal, resp. rate 18, height 1.778 m (5' 10\"), weight 62.1 kg (136 lb 14.5 oz), SpO2 96 %.  General: Patient appears ok at the present time. NAD  Skin: was dressed by wound care films reviewed  HEENT:  Neck is supple, No subconjunctival hemorrhages, no oral exudates  Heart: S1 S2  Lungs: clear bilaterally  Abdomen: soft, ND, NTTP,  Back :no CVA tenderness  Extrem: No edema, non tender  Neuro exam: CN II-XII intact  Psych: cooperative    Labs:  I have reviewed all lab results by electronic record, including most recent CBC, metabolic panel, and pertinent abnormalities were addressed from an infectious disease perspective.  Trends are being monitored over time.    Lab Results   Component Value Date    WBC 7.7 12/26/2023    HGB 8.1 (L) 12/26/2023    HCT 24.8 (L) 12/26/2023    MCV 89 12/26/2023     12/26/2023     Lab Results   Component Value Date    GLUCOSE 103 (H) 12/26/2023    CALCIUM 7.9 (L) 12/26/2023     12/26/2023    K 5.4 (H) " 12/26/2023    CO2 18 (L) 12/26/2023    CL 99 12/26/2023     (HH) 12/26/2023    CREATININE 12.79 (H) 12/26/2023       Radiology:  I have reviewed imaging results per electronic record and most pertinent abnormalities are being addressed from an infectious disease standpoint.            ASSESSMENT:  Problem List Items Addressed This Visit          Infectious Diseases    Cellulitis of both lower extremities - Primary     Other Visit Diagnoses       ESRD (end stage renal disease) (CMS/[facility])        Elevated troponin        Normocytic anemia               Contact dermatitis likely irritant moisture and stasis dermatitis chronic leg swelling with partial-thickness skin loss ,  possibly a component of cellulitis and wounds are likely colonized with bacteria given the larger environment.Also consider poor healing soft issues with kidney function, calciphylaxis    PLAN:   Needs aggressive wound care, and mitigation strategies to avoid moisture which will likely be challenging given kidney disease and edema      So far pseudomonas growing small numbers from moist environment    Would try cipro 5 more days

## 2023-12-26 NOTE — DISCHARGE SUMMARY
Medical Group Discharge Summary  DISCHARGE DIAGNOSIS     Bilateral lower extremity cellulitis with Pseudomonas wound infection however likely colonized  CKD stage V  Stage II bilateral buttock pressure injury  Elevated troponin    HOSPITAL COURSE AND DETAILS     This is a 89-year-old male with a significant past medical history of CKD stage V not seeking hemodialysis, BPH, hypertension, dissects CHF presented from home with worsening bilateral lower extremity swelling, erythema, wounds.    Patient was aggressively treated with IV antibiotics and local wound care.  Significant improvement in his lower extremity wounds from admission to now (refer to media section for updated images on a every other day basis).  Wound cultures grew rare Pseudomonas.  Patient was seen by infectious diseases, this was likely colonized due to persistent moisture in the wound.  He completed course of vancomycin and Zosyn while hospitalized and was transition to ciprofloxacin renally dosed for short course p.o. Patient's elevated troponin was likely in setting of CKD stage V, no chest pain or EKG changes, very low concerns for ACS.    Patient is being discharged to SNF after pre-CERT was received this morning.  He will continue PT/OT there.  He will follow-up with his PCP to discuss hospitalization.  Patient is CKD stage V and not seeking hemodialysis, also DNR/DNI confirmed by his family.  [facility].    Total time spent on discharge: >30min      ---Of note, this documentation is completed using the Dragon Dictation system (voice recognition software). There may be spelling and/or grammatical errors that were not corrected prior to final submission.---    Matthew Edge MD    DISCHARGE PHYSICAL EXAM     Last Recorded Vitals:  Vitals:    12/25/23 2102 12/26/23 0607 12/26/23 0700 12/26/23 0834   BP: 120/68 88/58 104/58 127/65   BP Location: Right arm       Patient Position: Lying      Pulse: 66 108  97   Resp: 18      Temp: 36.7 °C (98.1 °F) 36.8 °C (98.2 °F)  36 °C (96.8 °F)   TempSrc:    Temporal   SpO2: 98% 98%  96%   Weight:       Height:         Physical Exam:  - General: Alert and oriented x3. No acute distress. Well-nourished  - HEENT: Normocephalic atraumatic.  Neck supple/range of motion intact.  EOMI  - Respiratory: No acute respiratory distress. Breath sounds clear to auscultation bilaterally.  - Cardiovascular: Regular rate and rhythm, normal S1/S2.  - Abdomen: Abdomen soft. Bowel sounds present. Nontender to deep palpation.    - Extremities: Bilateral upper and lower extremities with range of motion intact, strength 2-3  - Skin: See image below for lower extremity exam (significantly improved from admission)      DISCHARGE MEDICATIONS        Your medication list        START taking these medications        Instructions Last Dose Given Next Dose Due   ciprofloxacin 250 mg tablet  Commonly known as: Cipro      Take 1 tablet (250 mg) by mouth once daily for 5 days.       lactobacillus acidophilus & bulgar 1 million cell chewable tablet  Commonly known as: Lactinex      Chew 1 tablet 2 times a day after meals for 10 days.       traMADol 50 mg tablet  Commonly known as: Ultram      Take 1 tablet (50 mg) by mouth every 12 hours if needed for moderate pain (4 - 6).              CONTINUE taking these medications        Instructions Last Dose Given Next Dose Due   amLODIPine 5 mg tablet  Commonly known as: Norvasc           calcitriol 0.25 mcg capsule  Commonly known as: Rocaltrol           carvedilol 25 mg tablet  Commonly known as: Coreg           cholecalciferol 25 MCG (1000 UT) tablet  Commonly known as: Vitamin D-3           cyanocobalamin 1,000 mcg tablet  Commonly known as: Vitamin B-12           doxazosin 4 mg tablet  Commonly known as: Cardura           ferrous sulfate (325 mg ferrous sulfate) tablet           finasteride 5 mg tablet  Commonly known as:  Proscar           fluticasone 50 mcg/actuation nasal spray  Commonly known as: Flonase           polyethylene glycol 17 gram packet  Commonly known as: Glycolax, Miralax           sevelamer carbonate 800 mg tablet  Commonly known as: Renvela           sodium bicarbonate 650 mg tablet           torsemide 20 mg tablet  Commonly known as: Demadex                     Where to Get Your Medications        You can get these medications from any pharmacy    Bring a paper prescription for each of these medications  traMADol 50 mg tablet       Information about where to get these medications is not yet available    Ask your nurse or doctor about these medications  ciprofloxacin 250 mg tablet  lactobacillus acidophilus & bulgar 1 million cell chewable tablet           OUTPATIENT FOLLOW-UP     No future appointments.

## 2023-12-28 NOTE — DOCUMENTATION CLARIFICATION NOTE
"    PATIENT:               JAMES BEAL  ACCT #:                  [id number]  MRN:                       [id number]  :                       1933  ADMIT DATE:       2023 2:32 PM  DISCH DATE:        2023 7:05 PM  RESPONDING PROVIDER #:        [id number]          PROVIDER RESPONSE TEXT:    Severe Protein Calorie Malnutrition    [facility] QUERY TEXT:    [facility]_Nutrition Diagnosis    Instruction:    Based on your assessment of the patient and the clinical information, please provide the requested documentation by clicking on the appropriate radio button and enter any additional information if prompted.    Question: Please further clarify this patient nutritional status as    When answering this query, please exercise your independent professional judgment. The fact that a question is being asked, does not imply that any particular answer is desired or expected.    The patient's clinical indicators include:  Clinical Information: [facility], pain and redness and found to have cellulitis    Clinical Indicators:    BMI 19.64    Per the nutrition consult note dated 23 - \"Severe malnutrition related to chronic disease or condition  As Evidenced by:  greater than 20 percent wt loss x 1 month, severe muscle losses, severe fat losses\"    Treatment: Ensure once daily    Risk Factors: Chronic diastolic chf, cellulitis  Options provided:  -- Severe Protein Calorie Malnutrition  -- Protein Calorie Malnutrition, Please specify severity  -- Other - I will add my own diagnosis  -- Refer to Clinical Documentation Reviewer    Query created by: Nino Cleveland on 2023 7:44 AM      Electronically signed by:  ISHMAEL HDZ MD 2023 10:21 AM          "